# Patient Record
Sex: MALE | Race: WHITE | Employment: FULL TIME | URBAN - METROPOLITAN AREA
[De-identification: names, ages, dates, MRNs, and addresses within clinical notes are randomized per-mention and may not be internally consistent; named-entity substitution may affect disease eponyms.]

---

## 2017-08-09 ENCOUNTER — OFFICE VISIT (OUTPATIENT)
Dept: FAMILY MEDICINE CLINIC | Age: 32
End: 2017-08-09
Payer: COMMERCIAL

## 2017-08-09 ENCOUNTER — TELEPHONE (OUTPATIENT)
Dept: FAMILY MEDICINE CLINIC | Age: 32
End: 2017-08-09

## 2017-08-09 VITALS
TEMPERATURE: 98 F | SYSTOLIC BLOOD PRESSURE: 111 MMHG | BODY MASS INDEX: 21.76 KG/M2 | OXYGEN SATURATION: 99 % | HEIGHT: 70 IN | HEART RATE: 63 BPM | DIASTOLIC BLOOD PRESSURE: 66 MMHG | RESPIRATION RATE: 16 BRPM | WEIGHT: 152 LBS

## 2017-08-09 DIAGNOSIS — H10.31 ACUTE BACTERIAL CONJUNCTIVITIS OF RIGHT EYE: Primary | ICD-10-CM

## 2017-08-09 DIAGNOSIS — Z11.4 ENCOUNTER FOR SCREENING FOR HIV: ICD-10-CM

## 2017-08-09 DIAGNOSIS — Z23 NEED FOR VACCINATION: ICD-10-CM

## 2017-08-09 DIAGNOSIS — Z23 NEED FOR 23-POLYVALENT PNEUMOCOCCAL POLYSACCHARIDE VACCINE: ICD-10-CM

## 2017-08-09 PROCEDURE — 99203 OFFICE O/P NEW LOW 30 MIN: CPT | Performed by: FAMILY MEDICINE

## 2017-08-09 PROCEDURE — 90732 PPSV23 VACC 2 YRS+ SUBQ/IM: CPT | Performed by: FAMILY MEDICINE

## 2017-08-09 PROCEDURE — 90471 IMMUNIZATION ADMIN: CPT | Performed by: FAMILY MEDICINE

## 2017-08-09 RX ORDER — NEOMYCIN SULFATE, POLYMYXIN B SULFATE AND GRAMICIDIN 1.75; 10000; .025 MG/ML; [USP'U]/ML; MG/ML
1 SOLUTION/ DROPS OPHTHALMIC 4 TIMES DAILY
Qty: 1 BOTTLE | Refills: 0 | Status: SHIPPED | OUTPATIENT
Start: 2017-08-09 | End: 2017-12-27 | Stop reason: ALTCHOICE

## 2017-10-11 ENCOUNTER — NURSE ONLY (OUTPATIENT)
Dept: FAMILY MEDICINE CLINIC | Age: 32
End: 2017-10-11
Payer: COMMERCIAL

## 2017-10-11 DIAGNOSIS — T14.8XXA PUNCTURE WOUND: Primary | ICD-10-CM

## 2017-10-11 PROCEDURE — 90715 TDAP VACCINE 7 YRS/> IM: CPT | Performed by: FAMILY MEDICINE

## 2017-10-11 PROCEDURE — 90471 IMMUNIZATION ADMIN: CPT | Performed by: FAMILY MEDICINE

## 2017-11-29 ENCOUNTER — OFFICE VISIT (OUTPATIENT)
Dept: FAMILY MEDICINE CLINIC | Age: 32
End: 2017-11-29
Payer: COMMERCIAL

## 2017-11-29 ENCOUNTER — HOSPITAL ENCOUNTER (OUTPATIENT)
Age: 32
Discharge: HOME | End: 2017-11-29
Payer: COMMERCIAL

## 2017-11-29 VITALS
RESPIRATION RATE: 20 BRPM | OXYGEN SATURATION: 100 % | HEIGHT: 70 IN | TEMPERATURE: 96.9 F | BODY MASS INDEX: 22.54 KG/M2 | SYSTOLIC BLOOD PRESSURE: 90 MMHG | WEIGHT: 157.4 LBS | DIASTOLIC BLOOD PRESSURE: 62 MMHG | HEART RATE: 66 BPM

## 2017-11-29 DIAGNOSIS — R25.2 LEG CRAMPS: Primary | ICD-10-CM

## 2017-11-29 DIAGNOSIS — F17.200 SMOKING: ICD-10-CM

## 2017-11-29 DIAGNOSIS — Z00.00 ANNUAL PHYSICAL EXAM: Primary | ICD-10-CM

## 2017-11-29 DIAGNOSIS — R25.2 LEG CRAMPS: ICD-10-CM

## 2017-11-29 DIAGNOSIS — R29.898 WEAKNESS OF BOTH LOWER EXTREMITIES: ICD-10-CM

## 2017-11-29 DIAGNOSIS — Z00.00 ANNUAL PHYSICAL EXAM: ICD-10-CM

## 2017-11-29 PROBLEM — H10.31 ACUTE BACTERIAL CONJUNCTIVITIS OF RIGHT EYE: Status: RESOLVED | Noted: 2017-08-09 | Resolved: 2017-11-29

## 2017-11-29 LAB
FOLATE: 18.5 NG/ML
TOTAL CK: 829 U/L (ref 39–308)
TSH SERPL DL<=0.05 MIU/L-ACNC: 2.16 MIU/L (ref 0.3–5)
VITAMIN B-12: 724 PG/ML (ref 211–946)
VITAMIN D 25-HYDROXY: 15.5 NG/ML (ref 30–100)

## 2017-11-29 PROCEDURE — 82746 ASSAY OF FOLIC ACID SERUM: CPT

## 2017-11-29 PROCEDURE — 82550 ASSAY OF CK (CPK): CPT

## 2017-11-29 PROCEDURE — 84443 ASSAY THYROID STIM HORMONE: CPT

## 2017-11-29 PROCEDURE — 82306 VITAMIN D 25 HYDROXY: CPT

## 2017-11-29 PROCEDURE — 36415 COLL VENOUS BLD VENIPUNCTURE: CPT

## 2017-11-29 PROCEDURE — 82607 VITAMIN B-12: CPT

## 2017-11-29 PROCEDURE — 99395 PREV VISIT EST AGE 18-39: CPT | Performed by: FAMILY MEDICINE

## 2017-11-29 RX ORDER — VARENICLINE TARTRATE 1 MG/1
1 TABLET, FILM COATED ORAL 2 TIMES DAILY
Qty: 60 TABLET | Refills: 3 | Status: SHIPPED | OUTPATIENT
Start: 2017-11-29 | End: 2017-12-27

## 2017-11-29 ASSESSMENT — ENCOUNTER SYMPTOMS
EYE REDNESS: 0
BACK PAIN: 0
BLOOD IN STOOL: 0
SINUS PRESSURE: 0
ABDOMINAL PAIN: 0
PHOTOPHOBIA: 0
DIARRHEA: 0
SHORTNESS OF BREATH: 0
SINUS PAIN: 0
COUGH: 0
NAUSEA: 0
CONSTIPATION: 0
RHINORRHEA: 0
WHEEZING: 0

## 2017-11-29 NOTE — PATIENT INSTRUCTIONS
doctor or physical therapist will tell you when you can start these exercises and which ones will work best for you. How to do the exercises  Calf wall stretch (back knee straight)    5. Stand facing a wall with your hands on the wall at about eye level. Put your affected leg about a step behind your other leg. 6. Keeping your back leg straight and your back heel on the floor, bend your front knee and gently bring your hip and chest toward the wall until you feel a stretch in the calf of your back leg. 7. Hold the stretch for at least 15 to 30 seconds. 8. Repeat 2 to 4 times. Calf wall stretch (knees bent)    5. Stand facing a wall with your hands on the wall at about eye level. Put your affected leg about a step behind your other leg. 6. Keeping both heels on the floor, bend both knees. Then gently bring your hip and chest toward the wall until you feel a stretch in the calf of your back leg. 7. Hold the stretch for at least 15 to 30 seconds. 8. Repeat 2 to 4 times. Hamstring wall stretch    1. Lie on your back in a doorway, with your good leg through the open door. 2. Slide your affected leg up the wall to straighten your knee. You should feel a gentle stretch down the back of your leg. ¨ Do not arch your back. ¨ Do not bend either knee. ¨ Keep one heel touching the floor and the other heel touching the wall. Do not point your toes. 3. Hold the stretch for at least 1 minute to begin. Then over time, try to lengthen the time you hold the stretch to as long as 6 minutes. 4. Repeat 2 to 4 times. If you do not have a place to do this exercise in a doorway, there is another way to do it:  5. Lie on your back, and bend the knee of your affected leg. 6. Loop a towel under the ball and toes of that foot, and hold the ends of the towel in your hands. 7. Straighten your knee, and slowly pull back on the towel. You should feel a gentle stretch down the back of your leg.   8. Hold the stretch for 15 to 30 seconds. Or even better, hold the stretch for 1 minute if you can. 9. Repeat 2 to 4 times. Shin muscle stretch    1. Sit in a chair, with both feet flat on the floor. 2. Bend your affected leg behind you so that the top of your foot near your toes is flat on the floor and your toes are pointed away from your body. If you need to, you can hold on to the sides of the chair for support. 3. Hold the stretch for at least 15 to 30 seconds. You should feel a stretch in the front (shin) of your lower leg. 4. Repeat 2 to 4 times. Follow-up care is a key part of your treatment and safety. Be sure to make and go to all appointments, and call your doctor if you are having problems. It's also a good idea to know your test results and keep a list of the medicines you take. Where can you learn more? Go to https://Superplayer.Oxxy. org and sign in to your [employer] account. Enter P354 in the Qyuki box to learn more about \"Shin Splints (Shin Pain): Exercises. \"     If you do not have an account, please click on the \"Sign Up Now\" link. Current as of: March 21, 2017  Content Version: 11.3  © 4175-7964 [employer], [employer]. Care instructions adapted under license by [facility] ([facility]). If you have questions about a medical condition or this instruction, always ask your healthcare professional. [place] [address] any warranty or liability for your use of this information. Patient Education        Stretching: Exercises  Your Care Instructions  Here are some examples of exercises for stretching. Start each exercise slowly. Ease off the exercise if you start to have pain. Your doctor or physical therapist will tell you when you can start these exercises and which ones will work best for you. How to do the exercises  Latissimus stretch    9. Stand with your back straight and your feet shoulder-width apart.  You can do this stretch sitting down if you are not steady on your your inner thighs. 8. Hold 15 to 30 seconds. 9. Repeat 2 to 4 times. Hamstring stretch in doorway    1. Lie on the floor near a doorway, with your buttocks close to the wall. 2. Let the leg you are not stretching extend through the doorway. 3. Put the leg you want to stretch up on the wall, and straighten your knee to feel a gentle stretch at the back of your leg. 4. Hold the stretch for at least 15 to 30 seconds. Repeat 2 to 4 times. Follow-up care is a key part of your treatment and safety. Be sure to make and go to all appointments, and call your doctor if you are having problems. It's also a good idea to know your test results and keep a list of the medicines you take. Where can you learn more? Go to https://Stylytmorganeb.[email or web address]. org and sign in to your J[employer] account. Enter [id number] in the [employer] box to learn more about \"Stretching: Exercises. \"     If you do not have an account, please click on the \"Sign Up Now\" link. Current as of: March 13, 2017  Content Version: 11.3  © 3081-0517 [employer], [employer]. Care instructions adapted under license by [facility] ([facility]). If you have questions about a medical condition or this instruction, always ask your healthcare professional. [address] any warranty or liability for your use of this information.

## 2017-11-29 NOTE — PROGRESS NOTES
Chief Complaint   Patient presents with    Annual Exam     physial weakness in legs and feet started in the spring          [facility]  here today for follow up on chronic medical problems, go over labs and/or diagnostic studies, and medication refills. Annual Exam (physial weakness in legs and feet started in the spring )      HPI : Patient is here for annual physical, patient has no medical problems. Patient complains of bilateral lower extremity cramps and weakness. Patient reports he feels weak after he comes back from work. He has to grab his legs for work. He denies any numbness or tingling. He feels physically weak. He denies any back pain or any trauma. He has tried some stretching exercises did not help.    -He smokes about half pack a day since last 15 years. Patient is willing to quit and is trying nicotine patches in the past did not help. He is willing to try Chantix. BP 90/62   Pulse 66   Temp 96.9 °F (36.1 °C) (Tympanic)   Resp 20   Ht 5' 10\" (1.778 m)   Wt 157 lb 6.4 oz (71.4 kg)   SpO2 100%   BMI 22.58 kg/m²   Body mass index is 22.58 kg/m². Wt Readings from Last 3 Encounters:   11/29/17 157 lb 6.4 oz (71.4 kg)   08/09/17 152 lb (68.9 kg)   08/18/14 155 lb (70.3 kg)        []Negative depression screening. No flowsheet data found. []1-4 = Minimal depression   []5-9 = Mild depression   []10-14 = Moderate depression   []15-19 = Moderately severe depression   []20-27 = Severe depression    Discussed testing with the patient and all questions fully answered. No results found for any previous visit.          Most recent labs reviewed:     No results found for: WBC, HGB, HCT, MCV, PLT    No results found for: NA, K, CL, CO2, BUN, CREATININE, GLUCOSE, CALCIUM     No results found for: ALT, AST, GGT, ALKPHOS, BILITOT    No results found for: TSHFT4, TSH    No results found for: CHOL  No results found for: TRIG  No results found for: HDL  No results found for: LDLCALC, LDLCHOLESTEROL  No results found for: LABVLDL, VLDL  No results found for: CHOLHDLRATIO    No results found for: LABA1C    No results found for: XQNACLZO90    No results found for: FOLATE    No results found for: IRON, TIBC, FERRITIN    No results found for: VITD25          Current Outpatient Prescriptions   Medication Sig Dispense Refill    varenicline (CHANTIX CONTINUING MONTH DONN) 1 MG tablet Take 1 tablet by mouth 2 times daily 60 tablet 3    neomycin-polymyxin-gramicidin (NEOSPORIN) 1.75-03274-. 025 ophthalmic solution Place 1 drop into both eyes 4 times daily 1 Bottle 0     No current facility-administered medications for this visit. Social History     Social History    Marital status: Single     Spouse name: N/A    Number of children: N/A    Years of education: N/A     Occupational History    Not on file. Social History Main Topics    Smoking status: Current Every Day Smoker     Packs/day: 0.50     Years: 8.00     Types: Cigarettes    Smokeless tobacco: Current User    Alcohol use Yes      Comment: once a month    Drug use: No    Sexual activity: Not Currently     Other Topics Concern    Not on file     Social History Narrative    No narrative on file     Ready to quit: Yes  Counseling given: Not Answered        No family history on file.          -rest of complaints with corresponding details per ROS    The patient's past medical, surgical, social, and family history as well as his current medications and allergies were reviewed as documented in today's encounter. Review of Systems   Constitutional: Negative for activity change, appetite change, diaphoresis, fatigue, fever and unexpected weight change. HENT: Negative for congestion, ear pain, hearing loss, mouth sores, postnasal drip, rhinorrhea, sinus pain and sinus pressure. Eyes: Negative for photophobia, redness and visual disturbance. Respiratory: Negative for cough, shortness of breath and wheezing. provided for stretching exercises. - Vitamin D 25 Hydroxy; Future  - Vitamin B12 & Folate; Future  - TSH With Reflex Ft4; Future  - CK; Future    3. Smoking  -Started on Chantix. - varenicline (CHANTIX CONTINUING MONTH DONN) 1 MG tablet; Take 1 tablet by mouth 2 times daily  Dispense: 60 tablet; Refill: 3      Orders Placed This Encounter   Procedures    Vitamin D 25 Hydroxy     Standing Status:   Future     Standing Expiration Date:   11/29/2018    Vitamin B12 & Folate     Standing Status:   Future     Standing Expiration Date:   11/29/2018    TSH With Reflex Ft4     Standing Status:   Future     Standing Expiration Date:   11/29/2018    CK     Standing Status:   Future     Standing Expiration Date:   11/29/2018         There are no discontinued medications. Sundar Duncan received counseling on the following healthy behaviors: nutrition, exercise, medication adherence, tobacco cessation and decrease in alcohol consumption  Reviewed prior labs and health maintenance  Continue current medications, diet and exercise. Discussed use, benefit, and side effects of prescribed medications. Barriers to medication compliance addressed. Patient given educational materials - see patient instructions  Was a self-tracking handout given in paper form or via Zero2IPOt? Yes    Requested Prescriptions     Signed Prescriptions Disp Refills    varenicline (CHANTIX CONTINUING MONTH PAK) 1 MG tablet 60 tablet 3     Sig: Take 1 tablet by mouth 2 times daily       All patient questions answered. Patient voiced understanding. Quality Measures    Body mass index is 22.58 kg/m². Normal. Weight control planned discussed Healthy diet and regular exercise. BP: 90/62 Blood pressure is normal. Treatment plan consists of Avoid Tobacco and Second-hand Smoke and No treatment change needed.     No results found for: LDLCALC, LDLCHOLESTEROL, LDLDIRECT (goal LDL reduction with dx if diabetes is 50% LDL reduction)      No flowsheet data

## 2017-12-01 DIAGNOSIS — E55.9 VITAMIN D DEFICIENCY: Primary | ICD-10-CM

## 2017-12-04 ENCOUNTER — HOSPITAL ENCOUNTER (OUTPATIENT)
Age: 32
Discharge: HOME | End: 2017-12-04
Payer: COMMERCIAL

## 2017-12-04 DIAGNOSIS — R29.898 WEAKNESS OF BOTH LOWER EXTREMITIES: ICD-10-CM

## 2017-12-04 DIAGNOSIS — R25.2 LEG CRAMPS: ICD-10-CM

## 2017-12-04 LAB
ABSOLUTE EOS #: 0.2 K/UL (ref 0–0.4)
ABSOLUTE IMMATURE GRANULOCYTE: NORMAL K/UL (ref 0–0.3)
ABSOLUTE LYMPH #: 1.9 K/UL (ref 1–4.8)
ABSOLUTE MONO #: 0.4 K/UL (ref 0.1–1.3)
ALBUMIN SERPL-MCNC: 4 G/DL (ref 3.5–5.2)
ALBUMIN/GLOBULIN RATIO: ABNORMAL (ref 1–2.5)
ALP BLD-CCNC: 82 U/L (ref 40–129)
ALT SERPL-CCNC: 45 U/L (ref 5–41)
ANION GAP SERPL CALCULATED.3IONS-SCNC: 11 MMOL/L (ref 9–17)
AST SERPL-CCNC: 32 U/L
BASOPHILS # BLD: 1 % (ref 0–2)
BASOPHILS ABSOLUTE: 0.1 K/UL (ref 0–0.2)
BILIRUB SERPL-MCNC: 0.24 MG/DL (ref 0.3–1.2)
BUN BLDV-MCNC: 16 MG/DL (ref 6–20)
BUN/CREAT BLD: ABNORMAL (ref 9–20)
CALCIUM SERPL-MCNC: 8.8 MG/DL (ref 8.6–10.4)
CHLORIDE BLD-SCNC: 104 MMOL/L (ref 98–107)
CO2: 24 MMOL/L (ref 20–31)
CREAT SERPL-MCNC: 0.65 MG/DL (ref 0.7–1.2)
DIFFERENTIAL TYPE: NORMAL
EOSINOPHILS RELATIVE PERCENT: 2 % (ref 0–4)
GFR AFRICAN AMERICAN: >60 ML/MIN
GFR NON-AFRICAN AMERICAN: >60 ML/MIN
GFR SERPL CREATININE-BSD FRML MDRD: ABNORMAL ML/MIN/{1.73_M2}
GFR SERPL CREATININE-BSD FRML MDRD: ABNORMAL ML/MIN/{1.73_M2}
GLUCOSE BLD-MCNC: 108 MG/DL (ref 70–99)
HCT VFR BLD CALC: 46.3 % (ref 41–53)
HEMOGLOBIN: 15.6 G/DL (ref 13.5–17.5)
IMMATURE GRANULOCYTES: NORMAL %
LYMPHOCYTES # BLD: 27 % (ref 24–44)
MCH RBC QN AUTO: 31.6 PG (ref 26–34)
MCHC RBC AUTO-ENTMCNC: 33.8 G/DL (ref 31–37)
MCV RBC AUTO: 93.5 FL (ref 80–100)
MONOCYTES # BLD: 6 % (ref 1–7)
PDW BLD-RTO: 13.2 % (ref 11.5–14.9)
PLATELET # BLD: 301 K/UL (ref 150–450)
PLATELET ESTIMATE: NORMAL
PMV BLD AUTO: 7.8 FL (ref 6–12)
POTASSIUM SERPL-SCNC: 4.1 MMOL/L (ref 3.7–5.3)
RBC # BLD: 4.95 M/UL (ref 4.5–5.9)
RBC # BLD: NORMAL 10*6/UL
SEDIMENTATION RATE, ERYTHROCYTE: 2 MM (ref 0–15)
SEG NEUTROPHILS: 64 % (ref 36–66)
SEGMENTED NEUTROPHILS ABSOLUTE COUNT: 4.5 K/UL (ref 1.3–9.1)
SODIUM BLD-SCNC: 139 MMOL/L (ref 135–144)
TOTAL PROTEIN: 6.3 G/DL (ref 6.4–8.3)
WBC # BLD: 7 K/UL (ref 3.5–11)
WBC # BLD: NORMAL 10*3/UL

## 2017-12-04 PROCEDURE — 85025 COMPLETE CBC W/AUTO DIFF WBC: CPT

## 2017-12-04 PROCEDURE — 80053 COMPREHEN METABOLIC PANEL: CPT

## 2017-12-04 PROCEDURE — 86038 ANTINUCLEAR ANTIBODIES: CPT

## 2017-12-04 PROCEDURE — 36415 COLL VENOUS BLD VENIPUNCTURE: CPT

## 2017-12-04 PROCEDURE — 85651 RBC SED RATE NONAUTOMATED: CPT

## 2017-12-05 LAB — ANTI-NUCLEAR ANTIBODY (ANA): NEGATIVE

## 2017-12-07 ENCOUNTER — TELEPHONE (OUTPATIENT)
Dept: FAMILY MEDICINE CLINIC | Age: 32
End: 2017-12-07

## 2017-12-07 DIAGNOSIS — R29.898 WEAKNESS OF BOTH LOWER EXTREMITIES: Primary | ICD-10-CM

## 2017-12-07 DIAGNOSIS — R74.8 ELEVATED CPK: ICD-10-CM

## 2017-12-07 NOTE — TELEPHONE ENCOUNTER
I reffered this pt to rheumatology  For elevated CPK and lower extremity weakness. Please follow on that.

## 2017-12-27 ENCOUNTER — OFFICE VISIT (OUTPATIENT)
Dept: FAMILY MEDICINE CLINIC | Age: 32
End: 2017-12-27
Payer: COMMERCIAL

## 2017-12-27 VITALS
WEIGHT: 159 LBS | BODY MASS INDEX: 22.76 KG/M2 | SYSTOLIC BLOOD PRESSURE: 101 MMHG | HEIGHT: 70 IN | DIASTOLIC BLOOD PRESSURE: 65 MMHG | HEART RATE: 66 BPM

## 2017-12-27 DIAGNOSIS — R74.8 ELEVATED CPK: Primary | ICD-10-CM

## 2017-12-27 DIAGNOSIS — R29.898 WEAKNESS OF BOTH LOWER EXTREMITIES: ICD-10-CM

## 2017-12-27 DIAGNOSIS — E55.9 VITAMIN D DEFICIENCY: ICD-10-CM

## 2017-12-27 PROCEDURE — 99214 OFFICE O/P EST MOD 30 MIN: CPT | Performed by: FAMILY MEDICINE

## 2017-12-27 ASSESSMENT — ENCOUNTER SYMPTOMS
ABDOMINAL PAIN: 0
ANAL BLEEDING: 0
SHORTNESS OF BREATH: 0
PHOTOPHOBIA: 0
BLOOD IN STOOL: 0
COUGH: 0
WHEEZING: 0
DIARRHEA: 0
RHINORRHEA: 0
SINUS PRESSURE: 0
CONSTIPATION: 0

## 2017-12-27 ASSESSMENT — PATIENT HEALTH QUESTIONNAIRE - PHQ9
SUM OF ALL RESPONSES TO PHQ9 QUESTIONS 1 & 2: 0
1. LITTLE INTEREST OR PLEASURE IN DOING THINGS: 0
SUM OF ALL RESPONSES TO PHQ QUESTIONS 1-9: 0
2. FEELING DOWN, DEPRESSED OR HOPELESS: 0

## 2017-12-27 NOTE — PROGRESS NOTES
Chief Complaint   Patient presents with    Fatigue     Both legs. Juan Sanchez  here today for follow up on chronic medical problems, go over labs and/or diagnostic studies, and medication refills. Fatigue (Both legs. )      HPI:Patient is here for follow-up on lab work and lower leg weakness. Patient blood work showed low vitamin D, he was started on vitamin D reports he did not pick that prescription yet. He has elevated CPK, patient was referred to a rheumatologist.  He has not made an appointment. Discussed with patient about all lab results. He is still complaining of fatigue and difficulty in going stairs, especially at the end of day. He denies any back pain or any spine injuries. /65   Pulse 66   Ht 5' 10\" (1.778 m)   Wt 159 lb (72.1 kg)   BMI 22.81 kg/m²   Body mass index is 22.81 kg/m². Wt Readings from Last 3 Encounters:   12/27/17 159 lb (72.1 kg)   11/29/17 157 lb 6.4 oz (71.4 kg)   08/09/17 152 lb (68.9 kg)        []Negative depression screening. PHQ Scores 12/27/2017   PHQ2 Score 0   PHQ9 Score 0      []1-4 = Minimal depression   []5-9 = Mild depression   []10-14 = Moderate depression   []15-19 = Moderately severe depression   []20-27 = Severe depression    Discussed testing with the patient and all questions fully answered. Hospital Outpatient Visit on 12/04/2017   Component Date Value Ref Range Status    GEORGI 12/05/2017 NEGATIVE  NEG Final    Comment: This test was run on the [employer] GEORGI test system. The system provides   ten test results (HEp-2NA, dsDNA, SSA, SSB, Sm, RNP, Scl-70, Marichuy-1, Centromere   and Histone analytes) from a single patient sample. A negative GEORGI screen   indicates that the specimen was negative for all ten markers.   Performed at [facility], [address] [place] [address] [phone]      WBC 12/04/2017 7.0  3.5 - 11.0 k/uL Final    RBC 12/04/2017 4.95  4.5 - 5.9 m/uL Final    Hemoglobin 12/04/2017 15.6  13.5 - Total Bilirubin 12/04/2017 0.24* 0.3 - 1.2 mg/dL Final    Total Protein 12/04/2017 6.3* 6.4 - 8.3 g/dL Final    Alb 12/04/2017 4.0  3.5 - 5.2 g/dL Final    Albumin/Globulin Ratio 12/04/2017 NOT REPORTED  1.0 - 2.5 Final    GFR Non- 12/04/2017 >60  >60 mL/min Final    GFR  12/04/2017 >60  >60 mL/min Final    GFR Comment 12/04/2017        Final    Comment: Average GFR for 30-36 years old:   80 mL/min/1.73sq m  Chronic Kidney Disease:   <60 mL/min/1.73sq m  Kidney failure:   <15 mL/min/1.73sq m              eGFR calculated using average adult body mass. Additional eGFR calculator   available at:        [email or web address]        Performed at [facility]: RANI CLARKE [address]   [phone]      GFR Staging 12/04/2017 NOT REPORTED   Final    Sed Rate 12/04/2017 2  0 - 15 mm Final    Comment: Performed at [facility]: [facility] [address] [facility]  [address]   [phone]           Most recent labs reviewed:     Lab Results   Component Value Date    WBC 7.0 12/04/2017    HGB 15.6 12/04/2017    HCT 46.3 12/04/2017    MCV 93.5 12/04/2017     12/04/2017       Lab Results   Component Value Date     12/04/2017    K 4.1 12/04/2017     12/04/2017    CO2 24 12/04/2017    BUN 16 12/04/2017    CREATININE 0.65 12/04/2017    GLUCOSE 108 12/04/2017    CALCIUM 8.8 12/04/2017        Lab Results   Component Value Date    ALT 45 (H) 12/04/2017    AST 32 12/04/2017    ALKPHOS 82 12/04/2017    BILITOT 0.24 (L) 12/04/2017       Lab Results   Component Value Date    TSH 2.16 11/29/2017       No results found for: CHOL  No results found for: TRIG  No results found for: HDL  No results found for: LDLCALC, LDLCHOLESTEROL  No results found for: LABVLDL, VLDL  No results found for: CHOLHDLRATIO    No results found for: [facility]    Lab Results   Component Value Date    EBGSJSPT89 724 11/29/2017       Lab Results Component Value Date    FOLATE 18.5 11/29/2017       No results found for: IRON, TIBC, FERRITIN    Lab Results   Component Value Date    VITD25 15.5 (L) 11/29/2017             Current Outpatient Prescriptions   Medication Sig Dispense Refill    Cholecalciferol 400 UNIT TABS tablet Take 1 tablet by mouth daily 30 tablet 3     No current facility-administered medications for this visit. Social History     Social History    Marital status: Single     Spouse name: N/A    Number of children: N/A    Years of education: N/A     Occupational History    Not on file. Social History Main Topics    Smoking status: Current Every Day Smoker     Packs/day: 0.50     Years: 8.00     Types: Cigarettes    Smokeless tobacco: Never Used    Alcohol use Yes      Comment: once a month    Drug use: No    Sexual activity: Not Currently     Other Topics Concern    Not on file     Social History Narrative    No narrative on file     Ready to quit: Yes  Counseling given: Yes        History reviewed. No pertinent family history.          -rest of complaints with corresponding details per ROS    The patient's past medical, surgical, social, and family history as well as his current medications and allergies were reviewed as documented in today's encounter. Review of Systems   Constitutional: Positive for fatigue. Negative for activity change, appetite change, fever and unexpected weight change. HENT: Negative for congestion, postnasal drip, rhinorrhea and sinus pressure. Eyes: Negative for photophobia and visual disturbance. Respiratory: Negative for cough, shortness of breath and wheezing. Cardiovascular: Negative for chest pain and palpitations. Gastrointestinal: Negative for abdominal pain, anal bleeding, blood in stool, constipation and diarrhea. Genitourinary: Negative for flank pain, frequency and testicular pain. Musculoskeletal: Positive for arthralgias.  Negative for gait problem and neck stiffness. Neurological: Positive for weakness and numbness. Negative for dizziness and light-headedness. Psychiatric/Behavioral: Negative for behavioral problems, decreased concentration, dysphoric mood and hallucinations. Physical Exam    PHYSICAL EXAM:   VITALS:   Vitals:    12/27/17 1122   BP: 101/65   Pulse: 66     GENERAL:  Patient is a well-developed, well-nourished male  in no acute distress, alert and oriented x3, appropriate and pleasant conversation. HEAD: Normocephalic, atraumatic. EYES: Pupils equal, round and reactive to light and accommodation, extraocular   movements intact. ENT: Moist mucous membranes. No erythema is noted. NECK: Supple. No masses. No lymphadenopathy. CARDIOVASCULAR: Regular rate and rhythm. PULMONARY: Lungs are clear to auscultation bilaterally. ABDOMEN: Soft, nontender, nondistended. Positive bowel sounds. MUSCULOSKELETAL: Strength 5/5 bilaterally in all extremities. No tenderness to   palpation of the ribs, long bones, or spine. Bilateral calf muscle tenderness, no swelling seen. No muscle atrophy seen. Reflexes normal.  Sensations normal  NEUROLOGIC: Cranial nerves II through XII grossly intact. No focal deficits are noted. ASSESSMENT AND PLAN      1. Elevated CPK  -Discussed with patient that he'll need to see a dermatologist, labs reprinted and encouraged to scheduled appointment. We will do EMG. - EMG; Future    2. Weakness of both lower extremities  -EMG as per orders, start vitamin D  - EMG; Future    3. Vitamin D deficiency  -Start vitamin D  - EMG; Future      Orders Placed This Encounter   Procedures    EMG     Standing Status:   Future     Standing Expiration Date:   2/25/2018     Order Specific Question:   Which body part? Answer:   b/l lower extremity weakness         Medications Discontinued During This Encounter   Medication Reason    neomycin-polymyxin-gramicidin (NEOSPORIN) 1.75-55404-. 025 ophthalmic solution Therapy completed    varenicline (CHANTIX CONTINUING MONTH PAK) 1 MG tablet Patient Choice       Navin received counseling on the following healthy behaviors: nutrition, exercise and medication adherence  Reviewed prior labs and health maintenance  Continue current medications, diet and exercise. Discussed use, benefit, and side effects of prescribed medications. Barriers to medication compliance addressed. Patient given educational materials - see patient instructions  Was a self-tracking handout given in paper form or via Revolution [employer]? Yes    Requested Prescriptions      No prescriptions requested or ordered in this encounter       All patient questions answered. Patient voiced understanding. Quality Measures    Body mass index is 22.81 kg/m². Normal. Weight control planned discussed Healthy diet and regular exercise. BP: 101/65 Blood pressure is normal. Treatment plan consists of No treatment change needed. No results found for: LDLCALC, LDLCHOLESTEROL, LDLDIRECT (goal LDL reduction with dx if diabetes is 50% LDL reduction)      PHQ Scores 12/27/2017   PHQ2 Score 0   PHQ9 Score 0     Interpretation of Total Score Depression Severity: 1-4 = Minimal depression, 5-9 = Mild depression, 10-14 = Moderate depression, 15-19 = Moderately severe depression, 20-27 = Severe depression      The patient's past medical, surgical, social, and family history as well as his   current medications and allergies were reviewed as documented in today's encounter. Medications, labs, diagnostic studies, consultations and follow-up as documented in this encounter. Return in about 2 months (around 2/27/2018) for for emg, results, rheumatology note . Patient was seen with total face to face time of 25 minutes. More than 50% of this visit was counseling and education.        Future Appointments  Date Time Provider Aman Gillespie   2/28/2018 8:30 AM [facility]     This note was completed by using the assistance of a speech-recognition program. However, inadvertent computerized transcription errors may be present. Although every effort was made to ensure accuracy, no guarantees can be provided that every mistake has been identified and corrected by editing.   Electronically signed by Bobby Barrios MD on 12/27/2017  12:13 PM

## 2018-05-02 ENCOUNTER — HOSPITAL ENCOUNTER (OUTPATIENT)
Dept: NEUROLOGY | Age: 33
Discharge: HOME | End: 2018-05-02
Payer: COMMERCIAL

## 2018-05-02 PROCEDURE — 95886 MUSC TEST DONE W/N TEST COMP: CPT | Performed by: PHYSICAL MEDICINE & REHABILITATION

## 2018-05-02 PROCEDURE — 95911 NRV CNDJ TEST 9-10 STUDIES: CPT | Performed by: PHYSICAL MEDICINE & REHABILITATION

## 2018-05-15 ENCOUNTER — TELEPHONE (OUTPATIENT)
Dept: FAMILY MEDICINE CLINIC | Age: 33
End: 2018-05-15

## 2018-05-22 ENCOUNTER — OFFICE VISIT (OUTPATIENT)
Dept: FAMILY MEDICINE CLINIC | Age: 33
End: 2018-05-22
Payer: COMMERCIAL

## 2018-05-22 VITALS
HEIGHT: 69 IN | HEART RATE: 58 BPM | SYSTOLIC BLOOD PRESSURE: 109 MMHG | TEMPERATURE: 97.2 F | WEIGHT: 153.2 LBS | OXYGEN SATURATION: 98 % | DIASTOLIC BLOOD PRESSURE: 63 MMHG | BODY MASS INDEX: 22.69 KG/M2

## 2018-05-22 DIAGNOSIS — M21.372 FOOT DROP, BILATERAL: Primary | ICD-10-CM

## 2018-05-22 DIAGNOSIS — R29.898 WEAKNESS OF BOTH LOWER EXTREMITIES: ICD-10-CM

## 2018-05-22 DIAGNOSIS — M21.371 FOOT DROP, BILATERAL: Primary | ICD-10-CM

## 2018-05-22 DIAGNOSIS — R74.8 ELEVATED CPK: ICD-10-CM

## 2018-05-22 DIAGNOSIS — R94.131 ABNORMAL EMG: ICD-10-CM

## 2018-05-22 PROCEDURE — 99214 OFFICE O/P EST MOD 30 MIN: CPT | Performed by: FAMILY MEDICINE

## 2018-05-22 PROCEDURE — G8420 CALC BMI NORM PARAMETERS: HCPCS | Performed by: FAMILY MEDICINE

## 2018-05-22 PROCEDURE — G8427 DOCREV CUR MEDS BY ELIG CLIN: HCPCS | Performed by: FAMILY MEDICINE

## 2018-05-22 PROCEDURE — 4004F PT TOBACCO SCREEN RCVD TLK: CPT | Performed by: FAMILY MEDICINE

## 2018-05-22 ASSESSMENT — ENCOUNTER SYMPTOMS
STRIDOR: 0
SINUS PAIN: 0
PHOTOPHOBIA: 0
DIARRHEA: 0
SINUS PRESSURE: 0
ABDOMINAL PAIN: 0
WHEEZING: 0
RHINORRHEA: 0
BACK PAIN: 0
COUGH: 0
CONSTIPATION: 0
SHORTNESS OF BREATH: 0

## 2018-05-30 ENCOUNTER — TELEPHONE (OUTPATIENT)
Dept: FAMILY MEDICINE CLINIC | Age: 33
End: 2018-05-30

## 2018-05-30 DIAGNOSIS — R29.898 WEAKNESS OF BOTH LOWER EXTREMITIES: ICD-10-CM

## 2018-05-30 DIAGNOSIS — M21.372 FOOT DROP, BILATERAL: Primary | ICD-10-CM

## 2018-05-30 DIAGNOSIS — R74.8 ELEVATED CPK: ICD-10-CM

## 2018-05-30 DIAGNOSIS — M21.371 FOOT DROP, BILATERAL: Primary | ICD-10-CM

## 2018-05-30 DIAGNOSIS — R94.131 ABNORMAL EMG: ICD-10-CM

## 2018-06-04 ENCOUNTER — HOSPITAL ENCOUNTER (OUTPATIENT)
Dept: MRI IMAGING | Age: 33
Discharge: HOME | End: 2018-06-06
Payer: COMMERCIAL

## 2018-06-04 DIAGNOSIS — R29.898 WEAKNESS OF BOTH LOWER EXTREMITIES: ICD-10-CM

## 2018-06-04 DIAGNOSIS — M21.372 FOOT DROP, BILATERAL: ICD-10-CM

## 2018-06-04 DIAGNOSIS — M21.371 FOOT DROP, BILATERAL: ICD-10-CM

## 2018-06-04 DIAGNOSIS — R74.8 ELEVATED CPK: ICD-10-CM

## 2018-06-04 DIAGNOSIS — R94.131 ABNORMAL EMG: ICD-10-CM

## 2018-06-04 PROCEDURE — 72148 MRI LUMBAR SPINE W/O DYE: CPT

## 2018-06-07 DIAGNOSIS — M54.16 SPINAL STENOSIS OF LUMBAR REGION WITH RADICULOPATHY: ICD-10-CM

## 2018-06-07 DIAGNOSIS — M48.061 SPINAL STENOSIS OF LUMBAR REGION WITH RADICULOPATHY: ICD-10-CM

## 2018-06-07 DIAGNOSIS — R74.8 ELEVATED CPK: ICD-10-CM

## 2018-06-07 DIAGNOSIS — M21.372 FOOT DROP, BILATERAL: ICD-10-CM

## 2018-06-07 DIAGNOSIS — R94.131 ABNORMAL EMG: Primary | ICD-10-CM

## 2018-06-07 DIAGNOSIS — M21.371 FOOT DROP, BILATERAL: ICD-10-CM

## 2018-06-12 ENCOUNTER — HOSPITAL ENCOUNTER (OUTPATIENT)
Dept: PHYSICAL THERAPY | Age: 33
Setting detail: THERAPIES SERIES
Discharge: HOME | End: 2018-06-12
Payer: COMMERCIAL

## 2018-06-12 PROCEDURE — 97162 PT EVAL MOD COMPLEX 30 MIN: CPT

## 2018-06-12 PROCEDURE — 97110 THERAPEUTIC EXERCISES: CPT

## 2018-06-12 ASSESSMENT — PAIN DESCRIPTION - LOCATION: LOCATION: LEG

## 2018-06-12 ASSESSMENT — PAIN DESCRIPTION - PROGRESSION: CLINICAL_PROGRESSION: GRADUALLY WORSENING

## 2018-06-12 ASSESSMENT — PAIN SCALES - GENERAL: PAINLEVEL_OUTOF10: 1

## 2018-06-12 ASSESSMENT — PAIN DESCRIPTION - DESCRIPTORS: DESCRIPTORS: ACHING

## 2018-06-12 ASSESSMENT — PAIN DESCRIPTION - ORIENTATION: ORIENTATION: RIGHT;LEFT

## 2018-06-14 ENCOUNTER — INITIAL CONSULT (OUTPATIENT)
Dept: NEUROSURGERY | Age: 33
End: 2018-06-14
Payer: COMMERCIAL

## 2018-06-14 VITALS
HEART RATE: 70 BPM | BODY MASS INDEX: 22.66 KG/M2 | HEIGHT: 69 IN | WEIGHT: 153 LBS | DIASTOLIC BLOOD PRESSURE: 62 MMHG | SYSTOLIC BLOOD PRESSURE: 107 MMHG

## 2018-06-14 DIAGNOSIS — G95.9 MYELOPATHY (HCC): ICD-10-CM

## 2018-06-14 DIAGNOSIS — R29.898 WEAKNESS OF BOTH LOWER EXTREMITIES: Primary | ICD-10-CM

## 2018-06-14 PROCEDURE — 99242 OFF/OP CONSLTJ NEW/EST SF 20: CPT | Performed by: NEUROLOGICAL SURGERY

## 2018-06-14 PROCEDURE — G8420 CALC BMI NORM PARAMETERS: HCPCS | Performed by: NEUROLOGICAL SURGERY

## 2018-06-14 PROCEDURE — G8427 DOCREV CUR MEDS BY ELIG CLIN: HCPCS | Performed by: NEUROLOGICAL SURGERY

## 2018-06-15 ENCOUNTER — HOSPITAL ENCOUNTER (OUTPATIENT)
Dept: PHYSICAL THERAPY | Age: 33
Setting detail: THERAPIES SERIES
Discharge: HOME | End: 2018-06-15
Payer: COMMERCIAL

## 2018-06-15 PROCEDURE — 97110 THERAPEUTIC EXERCISES: CPT

## 2018-06-15 ASSESSMENT — PAIN DESCRIPTION - PROGRESSION: CLINICAL_PROGRESSION: NOT CHANGED

## 2018-06-15 ASSESSMENT — PAIN SCALES - GENERAL: PAINLEVEL_OUTOF10: 0

## 2018-06-18 ENCOUNTER — HOSPITAL ENCOUNTER (OUTPATIENT)
Dept: PHYSICAL THERAPY | Age: 33
Setting detail: THERAPIES SERIES
Discharge: HOME | End: 2018-06-18
Payer: COMMERCIAL

## 2018-06-22 ENCOUNTER — HOSPITAL ENCOUNTER (OUTPATIENT)
Dept: PHYSICAL THERAPY | Age: 33
Setting detail: THERAPIES SERIES
Discharge: HOME | End: 2018-06-22
Payer: COMMERCIAL

## 2018-06-22 PROCEDURE — 97110 THERAPEUTIC EXERCISES: CPT

## 2018-06-22 ASSESSMENT — PAIN SCALES - GENERAL: PAINLEVEL_OUTOF10: 0

## 2018-06-22 ASSESSMENT — PAIN DESCRIPTION - PROGRESSION: CLINICAL_PROGRESSION: NOT CHANGED

## 2018-06-26 ENCOUNTER — APPOINTMENT (OUTPATIENT)
Dept: PHYSICAL THERAPY | Age: 33
End: 2018-06-26
Payer: COMMERCIAL

## 2018-06-26 ENCOUNTER — OFFICE VISIT (OUTPATIENT)
Dept: NEUROLOGY | Age: 33
End: 2018-06-26
Payer: COMMERCIAL

## 2018-06-26 VITALS
HEIGHT: 70 IN | SYSTOLIC BLOOD PRESSURE: 106 MMHG | BODY MASS INDEX: 27.06 KG/M2 | WEIGHT: 189 LBS | DIASTOLIC BLOOD PRESSURE: 66 MMHG | HEART RATE: 62 BPM

## 2018-06-26 DIAGNOSIS — R29.898 WEAKNESS OF BOTH LOWER EXTREMITIES: Primary | ICD-10-CM

## 2018-06-26 PROCEDURE — G8427 DOCREV CUR MEDS BY ELIG CLIN: HCPCS | Performed by: PSYCHIATRY & NEUROLOGY

## 2018-06-26 PROCEDURE — 99205 OFFICE O/P NEW HI 60 MIN: CPT | Performed by: PSYCHIATRY & NEUROLOGY

## 2018-06-26 PROCEDURE — G8419 CALC BMI OUT NRM PARAM NOF/U: HCPCS | Performed by: PSYCHIATRY & NEUROLOGY

## 2018-06-26 PROCEDURE — 1036F TOBACCO NON-USER: CPT | Performed by: PSYCHIATRY & NEUROLOGY

## 2018-06-29 ENCOUNTER — HOSPITAL ENCOUNTER (OUTPATIENT)
Dept: PHYSICAL THERAPY | Age: 33
Setting detail: THERAPIES SERIES
Discharge: HOME | End: 2018-06-29
Payer: COMMERCIAL

## 2018-06-29 PROCEDURE — 97110 THERAPEUTIC EXERCISES: CPT

## 2018-06-29 ASSESSMENT — PAIN DESCRIPTION - PROGRESSION: CLINICAL_PROGRESSION: NOT CHANGED

## 2018-06-29 ASSESSMENT — PAIN SCALES - GENERAL: PAINLEVEL_OUTOF10: 0

## 2018-07-03 ENCOUNTER — HOSPITAL ENCOUNTER (OUTPATIENT)
Dept: PHYSICAL THERAPY | Age: 33
Setting detail: THERAPIES SERIES
Discharge: HOME | End: 2018-07-03
Payer: COMMERCIAL

## 2018-07-03 PROCEDURE — 97110 THERAPEUTIC EXERCISES: CPT

## 2018-07-03 ASSESSMENT — PAIN SCALES - GENERAL: PAINLEVEL_OUTOF10: 0

## 2018-07-03 ASSESSMENT — PAIN DESCRIPTION - PROGRESSION: CLINICAL_PROGRESSION: NOT CHANGED

## 2018-07-07 ENCOUNTER — HOSPITAL ENCOUNTER (OUTPATIENT)
Dept: MRI IMAGING | Age: 33
Discharge: HOME | End: 2018-07-09
Payer: COMMERCIAL

## 2018-07-07 DIAGNOSIS — R29.898 WEAKNESS OF BOTH LOWER EXTREMITIES: ICD-10-CM

## 2018-07-07 DIAGNOSIS — G95.9 MYELOPATHY (HCC): ICD-10-CM

## 2018-07-07 PROCEDURE — 72141 MRI NECK SPINE W/O DYE: CPT

## 2018-07-07 PROCEDURE — 6360000004 HC RX CONTRAST MEDICATION: Performed by: NEUROLOGICAL SURGERY

## 2018-07-07 PROCEDURE — 2580000003 HC RX 258: Performed by: NEUROLOGICAL SURGERY

## 2018-07-07 PROCEDURE — A9579 GAD-BASE MR CONTRAST NOS,1ML: HCPCS | Performed by: NEUROLOGICAL SURGERY

## 2018-07-07 PROCEDURE — 70553 MRI BRAIN STEM W/O & W/DYE: CPT

## 2018-07-07 RX ORDER — SODIUM CHLORIDE 0.9 % (FLUSH) 0.9 %
10 SYRINGE (ML) INJECTION PRN
Status: DISCONTINUED | OUTPATIENT
Start: 2018-07-07 | End: 2018-07-10 | Stop reason: HOSPADM

## 2018-07-07 RX ADMIN — GADOTERIDOL 15 ML: 279.3 INJECTION, SOLUTION INTRAVENOUS at 09:03

## 2018-07-07 RX ADMIN — Medication 10 ML: at 09:03

## 2018-07-11 ENCOUNTER — TELEPHONE (OUTPATIENT)
Dept: NEUROSURGERY | Age: 33
End: 2018-07-11

## 2018-07-12 ENCOUNTER — HOSPITAL ENCOUNTER (OUTPATIENT)
Dept: MRI IMAGING | Age: 33
Discharge: HOME | End: 2018-07-14
Payer: COMMERCIAL

## 2018-07-12 DIAGNOSIS — G95.9 MYELOPATHY (HCC): ICD-10-CM

## 2018-07-12 DIAGNOSIS — R29.898 WEAKNESS OF BOTH LOWER EXTREMITIES: ICD-10-CM

## 2018-07-12 PROCEDURE — 72146 MRI CHEST SPINE W/O DYE: CPT

## 2018-07-13 ENCOUNTER — HOSPITAL ENCOUNTER (OUTPATIENT)
Dept: PHYSICAL THERAPY | Age: 33
Setting detail: THERAPIES SERIES
Discharge: HOME | End: 2018-07-13
Payer: COMMERCIAL

## 2018-07-13 ENCOUNTER — TELEPHONE (OUTPATIENT)
Dept: NEUROSURGERY | Age: 33
End: 2018-07-13

## 2018-07-13 PROCEDURE — 97110 THERAPEUTIC EXERCISES: CPT

## 2018-07-13 ASSESSMENT — PAIN DESCRIPTION - PROGRESSION: CLINICAL_PROGRESSION: NOT CHANGED

## 2018-07-13 NOTE — PROGRESS NOTES
Extension: 3+/5;4/5  L Ankle Dorsiflexion: 1/5  L Ankle Plantar Flexion: 3/5;3+/5  L Ankle Inversion: 3/5  L Ankle Eversion: 0/5  L Great Toe Extension: 0/5    Exercises  Exercise 1: Nustep, L5, 8'  Exercise 2: Step-ups, 6\", F/L, 10x@  Exercise 3: Calf stretches, 30\"x3  Exercise 4: Partial squats 10x 2; Lunges 10x@ - HEP  Exercise 5: SLS, 10x@  Exercise 6: 4way hip, red Tband, 10x@ - other leg on 4\" step - HEP - reviewed  Exercise 7: Knee extension, 20#, 10x3; Hams curls, 20#, 10x3  Exercise 8: Leg press, 45#, 10x3  Exercise 9: Cable walkout, 10#, 4 ways, 3x@  Exercise 10: Red Tband, B ankles, Df/PF, 10x@ - HEP  Exercise 11: BAPS,standing, L3, 10x@    Assessment:   Conditions Requiring Skilled Therapeutic Intervention  Body structures, Functions, Activity limitations: Decreased ADL status; Decreased functional mobility ; Decreased ROM; Decreased strength;Decreased balance;Decreased high-level IADLs  Assessment: Other problem: impaired gait. Still hasn't had the chance to check on the AFO as previously suggested. Has been busy moving to his new residence. Meeting 1/3 STG, 0/2 LTG, no significant gains made yet  Treatment Diagnosis: Impaired gait, decreased ROM/ strength and function of BLE's  Prognosis: Good  Patient Education: reviewed theraband ex. REQUIRES PT FOLLOW UP: Yes  Discharge Recommendations: Continue to assess pending progress    OutComes Score  LEFS Total Score: 33   Goals:  Short term goals  Time Frame for Short term goals: 6 visits  Short term goal 1: Improve ROM by 5-10 degrees in restricted planes to be able to put on socks/shoes without difficulty - ONGOING  Short term goal 2: Improve stance on SLS / tandem by 5 sec @ - PARTLY MET  Short term goal 3:  Independent with HEP - MET  Long term goals  Time Frame for Long term goals : 12 visits  Long term goal 1: Improve gait pattern with decreased high steppage, monitor efficacy of AFO once patient gets them - ONGOING  Long term goal 2: Improve score in

## 2018-07-13 NOTE — TELEPHONE ENCOUNTER
Jerilynn Harada, MD [facility]             He needs to follow-up with neurologist to review the results and undergo further work-up as needed. Previous Messages      ----- Message -----   From: Ulises Schaffer   Sent: 7/12/2018   To: Jerilynn Harada, MD     Patient is set to have MRI Thoracic today at 7:00 a.m. He has completed his MRI Brain and MRI Cervical. He would like to know the results, and if he needs to follow up with your or if the results just require a referral to other specialists as discussed in last visit. Thank you.

## 2018-07-20 ENCOUNTER — HOSPITAL ENCOUNTER (OUTPATIENT)
Dept: PHYSICAL THERAPY | Age: 33
Setting detail: THERAPIES SERIES
Discharge: HOME | End: 2018-07-20
Payer: COMMERCIAL

## 2018-07-20 PROCEDURE — 97110 THERAPEUTIC EXERCISES: CPT

## 2018-07-20 ASSESSMENT — PAIN DESCRIPTION - PROGRESSION: CLINICAL_PROGRESSION: NOT CHANGED

## 2018-07-24 ENCOUNTER — OFFICE VISIT (OUTPATIENT)
Dept: NEUROSURGERY | Age: 33
End: 2018-07-24
Payer: COMMERCIAL

## 2018-07-24 VITALS
HEART RATE: 68 BPM | SYSTOLIC BLOOD PRESSURE: 111 MMHG | HEIGHT: 70 IN | WEIGHT: 149 LBS | BODY MASS INDEX: 21.33 KG/M2 | DIASTOLIC BLOOD PRESSURE: 65 MMHG | TEMPERATURE: 97.6 F

## 2018-07-24 DIAGNOSIS — R29.898 WEAKNESS OF BOTH LOWER EXTREMITIES: Primary | ICD-10-CM

## 2018-07-24 PROCEDURE — G8420 CALC BMI NORM PARAMETERS: HCPCS | Performed by: NEUROLOGICAL SURGERY

## 2018-07-24 PROCEDURE — 1036F TOBACCO NON-USER: CPT | Performed by: NEUROLOGICAL SURGERY

## 2018-07-24 PROCEDURE — G8427 DOCREV CUR MEDS BY ELIG CLIN: HCPCS | Performed by: NEUROLOGICAL SURGERY

## 2018-07-24 PROCEDURE — 99213 OFFICE O/P EST LOW 20 MIN: CPT | Performed by: NEUROLOGICAL SURGERY

## 2018-07-24 NOTE — PROGRESS NOTES
allergies. Hematological: Negative for adenopathy. Does not bruise/bleed easily. Psychiatric/Behavioral: Negative for self-injury. The patient is not nervous/anxious. Physical Exam:      Physical Examination  /65 (Site: Right Arm, Position: Sitting, Cuff Size: Small Adult)   Pulse 68   Temp 97.6 °F (36.4 °C)   Ht 5' 10\" (1.778 m)   Wt 149 lb (67.6 kg)   BMI 21.38 kg/m²   Estimated body mass index is 21.38 kg/m² as calculated from the following:    Height as of this encounter: 5' 10\" (1.778 m). Weight as of this encounter: 149 lb (67.6 kg). General:  Fabiola Dwyer is a 27y.o. year old male who appears his stated age. HEENT: Normocephalic atraumatic. Neck supple. Neurologic Exam  Speech is fluent and intact. Affect and visage are normal and appropriate. Cranial nerves II through XII: Intact  Motor examination: Upper extremities strength is full and symmetric throughout. Left  hip flexor 5/5 knee extensor 5/5 TA 3/5 EHL 3/5 Soleus 4/5 Hip flexor 5/5 knee extensor 5/5. Right TA 3/5 EHL 3/5 Soleus 4/5. Sensory examination: Intact perception of light touch. Reflex examination: Patella 1/4. Negative Junior sign. Gait and posture: Upright posture. Slapping gait. Studies Review:     Reviewed MRI brain with and without contrast Type:  Film and Report    Images:  Date of Image: 7/7/18    No abnormality to explain foot drop. MRI cervical spine without contrast 7/7/18:    No cervical myelopathy. MRI thoracic spine without contrast 7/12/18:    1. Mild-to-moderate multilevel degenerative disc disease throughout the   thoracic spine with multilevel disc height loss and desiccation.  Scattered   shallow disc bulges without thoracic spinal canal or neural foraminal   stenosis. Assessment and Plan:      1.  Weakness of both lower extremities          Plan: Fabiola Dwyer is a 70-year-old  male previously seen in neurosurgery clinic on 6/14/18 for management of bilateral

## 2018-07-27 ENCOUNTER — HOSPITAL ENCOUNTER (OUTPATIENT)
Dept: PHYSICAL THERAPY | Age: 33
Setting detail: THERAPIES SERIES
Discharge: HOME | End: 2018-07-27
Payer: COMMERCIAL

## 2018-07-27 PROCEDURE — 97110 THERAPEUTIC EXERCISES: CPT

## 2018-07-27 ASSESSMENT — PAIN DESCRIPTION - PROGRESSION: CLINICAL_PROGRESSION: NOT CHANGED

## 2018-07-27 NOTE — PROGRESS NOTES
[address]   Outpatient Physical Therapy  [address] [facility]. [address]  Phone: [phone]  Fax: [phone]    Daily Progress Note    Date: 18    Patient Name: Zafar Harris        MRN: [id number]  Account: [de-identified] : 1985      General Information:  Referring Practitioner: Dr. Jovan Salinas  Referral Date : 18  Diagnosis: Foot drop, bilateral M21.371, M21.372 ; Weakness of BLE's R29.898  Follows Commands: Within Functional Limits  Onset Date: 17  PT Insurance Information: Medical [place] 60 visits/ year  Total # of Visits Approved: 12  Total # of Visits to Date: 8  No Show: 2  Canceled Appointment: 1    Subjective:  Subjective: Seen at doctor's office, referred to a neurologist . Violeta Jeter with leg cramps, feels L leg gets weaker     Pain:  Patient Currently in Pain: Denies  Clinical Progression: Not changed       Objective:  Exercise 1: Nustep, L5, 8'  Exercise 2: Step-ups, 6\", F/L, 10x@  Exercise 3: Calf stretches, 30\"x3  Exercise 5: SLS,5x@, on balance pad  Exercise 7: Knee extension, 20#, 10x3; Hams curls, 20#, 10x3  Exercise 8: Leg press, 45#, 10x3  Exercise 9: Cable walkout, 10#, 4 ways, 3x@  Exercise 11: BAPS,standing, L3, 10x@       Assessment: Body structures, Functions, Activity limitations: Decreased ADL status; Decreased functional mobility ; Decreased ROM; Decreased strength;Decreased high-level IADLs;Decreased balance  Assessment: Other problem: impaired gait. Reports MRI findings are normal for brain, cervical, thoracic regions. Rule out MS, will be scheduled for spinal tap. No significant change with gait. Reports fatigued after the exercises, unable to increase weights.   Treatment Diagnosis: Impaired gait, decreased ROM/ strength and function of BLE's  Prognosis: Good  REQUIRES PT FOLLOW UP: Yes  Discharge Recommendations: Continue to assess pending progress   Goals:  Patient Goals:  Patient goals : \" to be able to run again\"  Short Term Goals:  Time Frame for Short term goals: 6 visits  Short term goal 1: Improve ROM by 5-10 degrees in restricted planes to be able to put on socks/shoes without difficulty - ONGOING  Short term goal 2: Improve stance on SLS / tandem by 5 sec @ - PARTLY MET  Short term goal 3: Independent with HEP - MET  Long Term Goals:  Time Frame for Long term goals : 12 visits  Long term goal 1: Improve gait pattern with decreased high steppage, monitor efficacy of AFO once patient gets them - ONGOING  Long term goal 2: Improve score in LEFS by 10 points from 32 ( 40%, 60% disability index ) - ONGOING ( NOW 33 )    Plan:     Times per week: 2x/wk  Plan weeks: 6  Current Treatment Recommendations: Strengthening;ROM;Balance Training;Patient/Caregiver Education & Training;Home Exercise Program  Plan Comment: Progress as tolerated.      Therapy Time:  Time In: 0730  Time Out: 0815  Minutes: 45  Timed Code Treatment Minutes: 35 Minutes    Treatment Charges: Minutes Units   []  Ultrasound     []  Electrical-Stim     []  Iontophoresis     []  Traction     []  Massage       []  Eval     []  Gait     [x]  Ther Exercise 35  2    []  Manual Therapy       []  Ther Activities       []  Aquatics     []  Neuro Re-Ed       []  Other       Total Treatment Time: 28 2        Donna Bustamante PT Electronically signed by Yudi Casanova PT on 7/27/2018 at 8:15 AM

## 2018-08-03 ENCOUNTER — TELEPHONE (OUTPATIENT)
Dept: FAMILY MEDICINE CLINIC | Age: 33
End: 2018-08-03

## 2018-08-03 ENCOUNTER — HOSPITAL ENCOUNTER (OUTPATIENT)
Dept: PHYSICAL THERAPY | Age: 33
Setting detail: THERAPIES SERIES
Discharge: HOME | End: 2018-08-03
Payer: COMMERCIAL

## 2018-08-03 NOTE — TELEPHONE ENCOUNTER
I cannot order that , neurologist can do in their office,if he thinks there is indication. It is not done at lab.

## 2018-08-03 NOTE — PROGRESS NOTES
[address] [place] [address]   Outpatient Physical Therapy  Cancel/No Show Note    Date: 8/3/18    Patient Name: Nidia Stroud        MRN: [id number]  Account: [de-identified] : 1985      General Information:  Referring Practitioner: Dr. Simón Martinez  Referral Date : 18  Diagnosis: Foot drop, bilateral M21.371, M21.372 ; Weakness of BLE's R29.898  Onset Date: 17  PT Insurance Information: Medical [place] 60 visits/ year  Total # of Visits Approved: 12  Total # of Visits to Date: 8  No Show: 3  Canceled Appointment: 1    Subjective: No show for today        Marguerite Mohs, PT Electronically signed by Marguerite Mohs, PT on 8/3/2018 at 8:08 AM

## 2018-08-22 ENCOUNTER — INITIAL CONSULT (OUTPATIENT)
Dept: PHYSICAL MEDICINE AND REHAB | Age: 33
End: 2018-08-22
Payer: COMMERCIAL

## 2018-08-22 VITALS
SYSTOLIC BLOOD PRESSURE: 112 MMHG | HEART RATE: 62 BPM | DIASTOLIC BLOOD PRESSURE: 65 MMHG | HEIGHT: 70 IN | TEMPERATURE: 98.4 F | BODY MASS INDEX: 21.85 KG/M2 | WEIGHT: 152.6 LBS

## 2018-08-22 DIAGNOSIS — R29.898 WEAKNESS OF BOTH LOWER EXTREMITIES: Primary | ICD-10-CM

## 2018-08-22 DIAGNOSIS — M21.372 BILATERAL FOOT-DROP: ICD-10-CM

## 2018-08-22 DIAGNOSIS — M21.371 BILATERAL FOOT-DROP: ICD-10-CM

## 2018-08-22 PROCEDURE — 1036F TOBACCO NON-USER: CPT | Performed by: PHYSICAL MEDICINE & REHABILITATION

## 2018-08-22 PROCEDURE — G8427 DOCREV CUR MEDS BY ELIG CLIN: HCPCS | Performed by: PHYSICAL MEDICINE & REHABILITATION

## 2018-08-22 PROCEDURE — G8420 CALC BMI NORM PARAMETERS: HCPCS | Performed by: PHYSICAL MEDICINE & REHABILITATION

## 2018-08-22 PROCEDURE — 99204 OFFICE O/P NEW MOD 45 MIN: CPT | Performed by: PHYSICAL MEDICINE & REHABILITATION

## 2018-08-22 NOTE — PROGRESS NOTES
[facility] PHYSICIANS  [facility] PHYSICAL MEDICINE AND REHABILITATION  [address] [id number]  Dept: [phone]  Dept Fax: [phone]    New Patient Consultation Note    Taz Torres, 27 y.o., male, is c/o of Other ( bilateral foot drop and bilateral lower extremity weakness;  was in PT previously;  had emg testing and MRI,  is currently treating with a neurologist and has seen a neurosurgeon but is not a surgical candidate)   and request for evaluation of  by Edin Castillo MD.    HPI:     HPI  Patient referred for evaluation of bilateral foot drop. He is status post EMG testing. He's been seen by neurology and neurosurgery. He is awaiting follow-up with neurology again. 22-year-old male who notes distal lower extremity weakness questionable some proximal weakness  particularly on the left. He notes symptoms began  1 year ago and gradually noted progressive weakness of distal lower extremity particularly since November 2017. He feels  left is greater than the right. He denies any trauma or accident ,denies any low back pain. He denies any difficulties bowels or bladder. He's had EMG , MRI, neurology evaluation neurosurgery evaluation. He was supposed to have spinal tap for CSF study and follow-up again with neurology. He has not had his completed. 7/24/18  Neurosurgery evaluation notes MRI spine without contrast 6/4/18 which reviewed.,  MRI brain 7/718, cervical spine without contrast 7/718 and thoracic spine without contrast 7/12/18 were reviewed by neurosurgery. There is some evidence of moderate degenerative changes within the thoracic spine but nothing to explain dorsiflexion weakness. He did not feel the patient benefited from a surgical intervention. He is to follow-up with neurology.     6/26/18 Neurology -Dr. Angie Rai, reviewed EMG nerve conduction studies which noted multilevel radiculopathy lower extremities, neurology did not feel patient has peripheral neuropathy, - concerned of possible CIDP distal form, recommended LP for CSF study and possible repeat EMG/nerve conduction studies. Patient's follow-up with neurology in 6-8 weeks. There was notied some elevated CPK on blood work per neurology    EMG 5/2/18 done by myself-  subacute to chronic bilateral multilevel radiculopathy affecting at least the L3 to S1 level and greater  of the L4 and L5-S1 level bilaterally. Review  noted decreased /absent amplitude of bilateral peroneal motors, right tibial motor reveal normal amplitude, there is no prolonged distal latencies, conduction velocities were normal, no temporal dispersion or blocking was noted. The right upper extremity ulnar sensory and motor appeared within normal limits and EMG of the right upper extremity appeared be normal at that time. MRI lumbosacral spine 6/4/18  1. Scattered degenerative changes of the lumbar spine without significant   spinal canal stenosis or neural foraminal narrowing. 2. Minimal retrolisthesis at L5-S1.         MRI cervical spine 7/7/18  No cervical myelopathy    MRI thoracic spine 7/12/18  . Mild-to-moderate multilevel degenerative disc disease throughout the   thoracic spine with multilevel disc height loss and desiccation.  Scattered   shallow disc bulges without thoracic spinal canal or neural foraminal   stenosis. MRI brain 7/7/18  No abnormality to explain foot drop. Past Medical History:   Diagnosis Date    Cleft lip       Past Surgical History:   Procedure Laterality Date    APPENDECTOMY      CLEFT LIP REPAIR         Family History   Problem Relation Age of Onset    Diabetes Father        Social History   Substance Use Topics    Smoking status: Former Smoker     Packs/day: 0.50     Years: 8.00     Types: Cigarettes     Start date: 6/14/2002     Quit date: 5/14/2018    Smokeless tobacco: Never Used    Alcohol use No        No current outpatient prescriptions on file.      No current facility-administered medications for this visit. Allergies   Allergen Reactions    Vicodin [Hydrocodone-Acetaminophen] Other (See Comments)     Pt states that he blacks out. Subjective:      Review of Systems  CONSTITUTIONAL: Negative for fever, chills, sweat, fatigue and unexpected weight change. HEENT:  Negative for diplopia, blind spots, blurred vision, hearing loss, trouble chewing or swallowing, denies coughing while eating or drinking denies nosebleed    RESPIRATORY: Negative for wheezing, coughing or shortness of breath    CARDIOVASCULAR: Negative for chest pain, palpitations, lightheadedness  GASTROINTESTINAL: Negative for heartburn, nausea, constipation, diarrhea, abdominal pain  or incontinence  GENTIOURNIARY: Negative for dysuria, urgency, frequency, incontinence and difficulty urinating. ENDOCRINE: Negative for hot or cold intolerance, denies any significant weight change  MUSCULOSKELETAL: Negative for focal joint pain, back pain, neck pain and arthralgias. Humphrey Vance: Negative for focal weakness, numbness, tingling, balance loss, headaches or falls  BEHAVIOR/PSYCY: Denies depression, anxiety, memory loss or insomnia  SKIN: Denies ulcers, rashes or bruises     Objective:     Physical Exam  /65   Pulse 62   Temp 98.4 °F (36.9 °C) (Tympanic)   Ht 5' 10\" (1.778 m)   Wt 152 lb 9.6 oz (69.2 kg)   BMI 21.90 kg/m²     Nursing notes and vitals reviewed    CONSTITUTIONAL: He is oriented to person, place, and time. He appears well-developed and well-nourished.    HEENT: Pupils equal reactive to light, ocular motion intact, visual fields are full, no facial asymmetry, speech fluent, no dysarthria, comprehension intact, object naming intact, repetition intact, basic cognition intact, there is questionable fasciculations of tongue - equivocal, cleft palate  CARDIOVASCULAR: Heart regular rate and rhythm with no murmurs rubs or gallops   PULMONARY/CHEST: Clear to auscultation with no wheezes or crackles noted  ABDOMEN: Soft, nontender with positive bowel sounds, no guarding or rebound   NEUROLOGIC:    Orientation: Alert and oriented ×3,    cranial nerves:  II through XII are intact,   Strength:5 out of 5 throughout bilateral upper    Sensation: Intact to light touch and pinprick   Balance: Intact, Romberg's is negative   Deep tendon reflexes: Bilaterally equal and symmetric-upper extremities,     EXTREMITIES: No calf tenderness, , negative warmth, pulses are intact  SKIN: Skin is warm and dry. PSYCIATIRIC: normal mood and affect, behavior is normal. Thought content normal.   Examination lower extremities reveals deep tendon reflexes 1+ at the quads and absent the ankles, strength reveals 0 out of 5 EHL strength bilaterally, dorsiflexion right and left 2/5 plantar flexion 4/5, inversion 1/5 inversion 3-4/5 and proximal knee and hip strength is 5 out of 5, sensation appear grossly intact to light touch and pinprick, he notes some questionable weakness of left hip though not noted on exam.  He ambulate with steppage gait bilaterally and foot slap  without assistive device. Assessment:       Diagnosis Orders   1. Weakness of both lower extremities  AFO Replaced by [facility] -  Jorge Alberto Ortiz MD, Neurology [facility]    EMG   2. Bilateral foot-drop   [facility] Addie Opitz, MD, Neurology [facility]    EMG          Plan:      1. Patient continues with lower extremity weakness and foot drop of question etiology. EMG showed significant abnormality of the  lower extremities with absent to decrease amplitude peroneal responses, however, there was no noted prolonged distal latency, temporal dispersion, blocking, etc.  Conduction velocities were normal..  Examination right upper extremity appeared to be normal.  EMG showed acute denervation/chronic reinnervation pattern of the lower extremities but was normal in the right upper extremity. This was read as a multilevel subacute to acute radiculopathy lower extremities. However considering MRI results and neurosurgical evaluation this does not appear be coming from the spine. Patient examination with continued lower extermity weakness and foot drop with questionable progression per patient and plus minus fasciculation of tongue  -, will need further workup from neurology i.e. CIDP/motor neuron disorder/axonal neuropathy/myositic process etc.  As EMG was done back in May 2018, would recommend repeat testing for further evaluation. This was ordered and will obtain over the next few weeks. 2.  Due to bilateral foot drop, would recommend bilateral ankle-foot orthoses and physical therapy to work on ambulation with best assistive device. Fall precautions were reviewed. A prescription for both were given. 3.  The importance for follow-up with neurology for spinal tap, follow-up and evaluation was reinforced with patient. He was given the number and referral to return back to neurology for further evaluation and recommendation. We'll also repeat EMG and have these forwarded to neurology as well. Orders Placed This Encounter   Procedures    AFO Brace     Bilateral due to foot drop   [address]     Referral Priority:   Routine     Referral Type:   Eval and Treat     Referral Reason:   Specialty Services Required     Requested Specialty:   Physical Therapy     Number of Visits Requested:   [address] [facility] Paula Templeton MD, Neurology [address]     Referral Priority:   Routine     Referral Type:   Eval and Treat     Referral Reason:   Specialty Services Required     Referred to Provider:   Kearney Spurling, MD     Requested Specialty:   Neurology     Number of Visits Requested:   1         Return in about 2 months (around 10/22/2018) for Rehab, EMG. Electronically signed by Case Leonard. Alla El MD on 8/22/2018 at 6:05 PM    Please note that this chart was generated

## 2018-08-22 NOTE — LETTER
[facility] PHYSICIANS  [facility] PHYSICAL MEDICINE AND REHABILITATION  [address]  [facility] [address]  Dept: [phone]  Dept Fax: [phone]      8/22/18    Patient: Omaira Amezquita  YOB: 1985    Dear  Meron Avery MD ,    I had the pleasure of seeing your patient, Omaira Amezquita today in the office today. Below are the relevant portions of my assessment and plan of care. IMPRESSION:      Diagnosis Orders   1. Weakness of both lower extremities  AFO [facility], Neu[facility] [address]    EMG   2. Bilateral foot-drop  AFO 1 [facility] Rae Catherine MD, Neurology [address]    EMG       PLAN:     1. Patient continues with lower extremity weakness and foot drop of question etiology. EMG showed significant abnormality of the  lower extremities with absent to decrease amplitude peroneal responses, however, there was no noted prolonged distal latency, temporal dispersion, blocking, etc.  Conduction velocities were normal..  Examination right upper extremity appeared to be normal.  EMG showed acute denervation/chronic reinnervation pattern of the lower extremities but was normal in the right upper extremity. This was read as a multilevel subacute to acute radiculopathy lower extremities. However considering MRI results and neurosurgical evaluation this does not appear be coming from the spine. Patient examination with continued lower extermity weakness and foot drop with questionable progression per patient and plus minus fasciculation of tongue  -, will need further workup from neurology i.e. CIDP/motor neuron disorder/axonal neuropathy/myositic process etc.  As EMG was done back in May 2018, would recommend repeat testing for further evaluation. This was ordered and will obtain over the next few weeks. 2.  Due to bilateral foot drop, would recommend bilateral ankle-foot orthoses and physical therapy to work on ambulation with best assistive device. Fall precautions were reviewed. A prescription for both were given. 3.  The importance for follow-up with neurology for spinal tap, follow-up and evaluation was reinforced with patient. He was given the number and referral to return back to neurology for further evaluation and recommendation. We'll also repeat EMG and have these forwarded to neurology as well. Orders Placed This Encounter   Procedures    AFO Brace     Bilateral due to foot drop   [address]     Referral Priority:   Routine     Referral Type:   Eval and Treat     Referral Reason:   Specialty Services Required     Requested Specialty:   Physical Therapy     Number of Visits Requested:   [address] [facility] Ester Bernard MD, Neurology [address]     Referral Priority:   Routine     Referral Type:   Eval and Treat     Referral Reason:   Specialty Services Required     Referred to Provider:   Corbin Perales MD     Requested Specialty:   Neurology     Number of Visits Requested:   1         Return in about 2 months (around 10/22/2018) for Rehab, EMG. Thank you for allowing me to participate in the care of this patient. I will keep you updated on this patient's follow up and I look forward to serving you and your patients again in the future. Cayden Machuca. Oscar Salinas MD

## 2018-08-23 ENCOUNTER — TELEPHONE (OUTPATIENT)
Dept: NEUROLOGY | Age: 33
End: 2018-08-23

## 2018-08-23 DIAGNOSIS — R29.898 WEAKNESS OF BOTH LOWER EXTREMITIES: Primary | ICD-10-CM

## 2018-08-23 NOTE — TELEPHONE ENCOUNTER
Let's arrange him for LP, I have placed order. He can have it done in next 1-3 weeks, come back after EMG is done. Please explain to him and his mother, LP is only for diagnostic purpose, it may give us helpful information but also may return normal. If he has questions, he can see me next week to review his imaging and discuss any question regarding LP. Thanks.

## 2018-08-23 NOTE — TELEPHONE ENCOUNTER
Gorge Sanchez Navin's mom called in today. She said that Willow Iraheta  kind of feels like he has been getting the run around from the different healthcare professionals. She was wondering if he was supposed to have an LP. I reviewed Dr. Kriss Albarran note which says will consider LP after EMG and imaging ordered by neurosurgery is done. He had the imaging. He saw Parker Muller yesterday in consult and his note is in Epic and the EMG is scheduled for 9/5/18 with Dr. Alexi Majano. Have you reviewed the MRI results yet? They were resulted to Dr. Pura Alcantar who had ordered them. Mom is concerned because he is getting worse and doesn't feel like he is getting answers. This is not about our office specifically, just his general treatment plan from all physicians. Do you want to order the LP now or have him back right after the EMG to evaluate him and decide from there? Her number is [phone]. He sleeps during the day time so it is easier to communicate with her and she is on the HIPAA consent form.

## 2018-09-04 ENCOUNTER — HOSPITAL ENCOUNTER (OUTPATIENT)
Dept: INTERVENTIONAL RADIOLOGY/VASCULAR | Age: 33
Discharge: HOME | End: 2018-09-06
Payer: COMMERCIAL

## 2018-09-10 ENCOUNTER — CLINICAL DOCUMENTATION (OUTPATIENT)
Dept: PHYSICAL MEDICINE AND REHAB | Age: 33
End: 2018-09-10

## 2018-09-21 ENCOUNTER — HOSPITAL ENCOUNTER (OUTPATIENT)
Dept: INTERVENTIONAL RADIOLOGY/VASCULAR | Age: 33
Discharge: HOME | End: 2018-09-23
Payer: COMMERCIAL

## 2018-09-21 VITALS
SYSTOLIC BLOOD PRESSURE: 96 MMHG | TEMPERATURE: 97.7 F | OXYGEN SATURATION: 97 % | RESPIRATION RATE: 14 BRPM | BODY MASS INDEX: 22.19 KG/M2 | HEIGHT: 70 IN | WEIGHT: 155 LBS | DIASTOLIC BLOOD PRESSURE: 57 MMHG | HEART RATE: 59 BPM

## 2018-09-21 DIAGNOSIS — R29.898 WEAKNESS OF BOTH LEGS: ICD-10-CM

## 2018-09-21 LAB
APPEARANCE CSF: CLEAR
GLUCOSE, CSF: 56 MG/DL (ref 40–70)
PROTEIN CSF: 26.1 MG/DL (ref 15–45)
RBC CSF: 1 /MM3
SUPERNAT COLOR CSF: COLORLESS
TUBE NUMBER CSF: 3
VOLUME CSF: 11
WBC CSF: 0 /MM3
XANTHOCHROMIA: ABNORMAL

## 2018-09-21 PROCEDURE — 82945 GLUCOSE OTHER FLUID: CPT

## 2018-09-21 PROCEDURE — 2500000003 HC RX 250 WO HCPCS: Performed by: RADIOLOGY

## 2018-09-21 PROCEDURE — 77003 FLUOROGUIDE FOR SPINE INJECT: CPT | Performed by: RADIOLOGY

## 2018-09-21 PROCEDURE — 62270 DX LMBR SPI PNXR: CPT | Performed by: RADIOLOGY

## 2018-09-21 PROCEDURE — 89050 BODY FLUID CELL COUNT: CPT

## 2018-09-21 PROCEDURE — 7100000030 HC ASPR PHASE II RECOVERY - FIRST 15 MIN

## 2018-09-21 PROCEDURE — 84157 ASSAY OF PROTEIN OTHER: CPT

## 2018-09-21 PROCEDURE — 7100000031 HC ASPR PHASE II RECOVERY - ADDTL 15 MIN

## 2018-09-21 PROCEDURE — 83873 ASSAY OF CSF PROTEIN: CPT

## 2018-09-21 PROCEDURE — 2709999900 IR LUMBAR PUNCTURE FOR DIAGNOSIS

## 2018-09-21 RX ORDER — LIDOCAINE HYDROCHLORIDE 20 MG/ML
INJECTION, SOLUTION INFILTRATION; PERINEURAL
Status: COMPLETED | OUTPATIENT
Start: 2018-09-21 | End: 2018-09-21

## 2018-09-21 RX ADMIN — LIDOCAINE HYDROCHLORIDE 5 ML: 20 INJECTION, SOLUTION INFILTRATION; PERINEURAL at 09:11

## 2018-09-21 NOTE — BRIEF OP NOTE
Brief Postoperative Note    Rory Jaramillo  YOB: 1985  [id number]    Pre-operative Diagnosis: LE weakness. Post-operative Diagnosis: Same    Procedure: LP under fluoro    Anesthesia: Local    Surgeons/Assistants: Saeid Freitas MD    Estimated Blood Loss: less than 50     Complications: None    Specimens: Was Obtained: 11 mls clear CSF provided for requested lab studies    Findings: 20 g spinal needle inserted intrathecally at L2-L3 under fluoro guidance.     Electronically signed by Saeid Freitas MD on 9/21/2018 at 9:35 AM

## 2018-09-21 NOTE — H&P
prior to encounter. Negative except for what is mentioned in the HPI. GENERAL PHYSICAL EXAM     Vitals: BP 97/63   Pulse 60   Temp 97.9 °F (36.6 °C) (Temporal)   Resp 14   Ht 5' 10\" (1.778 m)   Wt 155 lb (70.3 kg)   SpO2 97%   BMI 22.24 kg/m²  Body mass index is 22.24 kg/m². GENERAL APPEARANCE:   Cong Cifuentes is 27 y.o.,  male, not obese, nourished, conscious, alert. Does not appear to be distress or pain at this time. SKIN:  Warm, dry, no cyanosis or jaundice. HEAD:  Normocephalic, atraumatic, no swelling or tenderness. EYES:  Pupils equal, reactive to light. EARS:  No discharge, no marked hearing loss. NOSE:  No rhinorrhea, epistaxis or septal deformity. THROAT:  Not congested. No ulceration bleeding or discharge. NECK:  No stiffness, trachea central.  No palpable masses or L.N.                 CHEST:  Symmetrical and equal on expansion. HEART:  RRR S1 > S2. No audible murmurs or gallops. LUNGS:  Equal on expansion, normal breath sounds. No adventitious sounds. ABDOMEN:   Soft on palpation. No localized tenderness. No guarding or rigidity. No palpable hepatosplenomegaly. LYMPHATICS:  No palpable cervical lymphadenopathy. LOCOMOTOR, BACK AND SPINE:  No tenderness or deformities. EXTREMITIES:  Symmetrical, no pretibial edema. Virginias sign negative. No discoloration or ulcerations. NEUROLOGIC:  The patient is conscious, alert, oriented, No apparent focal sensory or motor deficits.                       PROVISIONAL DIAGNOSES / SURGERY:      WEAKNESS IN MARICRUZ LOWER EXTREMITIES  MARICRUZ FOOT DROP  LUMBAR PUNCTURE    Patient Active Problem List    Diagnosis Date Noted    Spinal stenosis of lumbar region with radiculopathy 06/07/2018    Foot drop, bilateral 05/22/2018    Abnormal EMG 05/22/2018    Elevated CPK 12/27/2017    Weakness of both lower extremities 12/27/2017    Vitamin D deficiency 12/27/2017    Cleft lip            ENRIQUE CROOKS, APRN - CNP on 9/21/2018 at 8:43 AM

## 2018-09-24 ENCOUNTER — HOSPITAL ENCOUNTER (EMERGENCY)
Age: 33
Discharge: HOME | End: 2018-09-25
Attending: EMERGENCY MEDICINE
Payer: COMMERCIAL

## 2018-09-24 VITALS
HEART RATE: 66 BPM | BODY MASS INDEX: 21.47 KG/M2 | HEIGHT: 70 IN | SYSTOLIC BLOOD PRESSURE: 120 MMHG | RESPIRATION RATE: 16 BRPM | WEIGHT: 150 LBS | OXYGEN SATURATION: 98 % | TEMPERATURE: 97.8 F | DIASTOLIC BLOOD PRESSURE: 69 MMHG

## 2018-09-24 DIAGNOSIS — G97.1 SPINAL PUNCTURE HEADACHE: Primary | ICD-10-CM

## 2018-09-24 LAB
ABSOLUTE EOS #: 0 K/UL (ref 0–0.4)
ABSOLUTE IMMATURE GRANULOCYTE: ABNORMAL K/UL (ref 0–0.3)
ABSOLUTE LYMPH #: 0.9 K/UL (ref 1–4.8)
ABSOLUTE MONO #: 0.2 K/UL (ref 0.2–0.8)
ANION GAP SERPL CALCULATED.3IONS-SCNC: 15 MMOL/L (ref 9–17)
BASOPHILS # BLD: 0 % (ref 0–2)
BASOPHILS ABSOLUTE: 0 K/UL (ref 0–0.2)
BUN BLDV-MCNC: 12 MG/DL (ref 6–20)
BUN/CREAT BLD: 20 (ref 9–20)
CALCIUM SERPL-MCNC: 9.2 MG/DL (ref 8.6–10.4)
CHLORIDE BLD-SCNC: 100 MMOL/L (ref 98–107)
CO2: 23 MMOL/L (ref 20–31)
CREAT SERPL-MCNC: 0.61 MG/DL (ref 0.7–1.2)
DIFFERENTIAL TYPE: ABNORMAL
EOSINOPHILS RELATIVE PERCENT: 0 % (ref 1–4)
GFR AFRICAN AMERICAN: >60 ML/MIN
GFR NON-AFRICAN AMERICAN: >60 ML/MIN
GFR SERPL CREATININE-BSD FRML MDRD: ABNORMAL ML/MIN/{1.73_M2}
GFR SERPL CREATININE-BSD FRML MDRD: ABNORMAL ML/MIN/{1.73_M2}
GLUCOSE BLD-MCNC: 119 MG/DL (ref 70–99)
HCT VFR BLD CALC: 45.4 % (ref 41–53)
HEMOGLOBIN: 15.9 G/DL (ref 13.5–17.5)
IMMATURE GRANULOCYTES: ABNORMAL %
LYMPHOCYTES # BLD: 9 % (ref 24–44)
MCH RBC QN AUTO: 31.6 PG (ref 26–34)
MCHC RBC AUTO-ENTMCNC: 35 G/DL (ref 31–37)
MCV RBC AUTO: 90.3 FL (ref 80–100)
MONOCYTES # BLD: 2 % (ref 1–7)
MYELIN BASIC PROTEIN, CSF: 2.52 NG/ML (ref 0–5.5)
NRBC AUTOMATED: ABNORMAL PER 100 WBC
PDW BLD-RTO: 12.9 % (ref 11.5–14.5)
PLATELET # BLD: 283 K/UL (ref 130–400)
PLATELET ESTIMATE: ABNORMAL
PMV BLD AUTO: 6.6 FL (ref 6–12)
POTASSIUM SERPL-SCNC: 3.7 MMOL/L (ref 3.7–5.3)
RBC # BLD: 5.02 M/UL (ref 4.5–5.9)
RBC # BLD: ABNORMAL 10*6/UL
SEG NEUTROPHILS: 89 % (ref 36–66)
SEGMENTED NEUTROPHILS ABSOLUTE COUNT: 8.9 K/UL (ref 1.8–7.7)
SODIUM BLD-SCNC: 138 MMOL/L (ref 135–144)
WBC # BLD: 10.1 K/UL (ref 3.5–11)
WBC # BLD: ABNORMAL 10*3/UL

## 2018-09-24 PROCEDURE — 96361 HYDRATE IV INFUSION ADD-ON: CPT

## 2018-09-24 PROCEDURE — 85025 COMPLETE CBC W/AUTO DIFF WBC: CPT

## 2018-09-24 PROCEDURE — 96374 THER/PROPH/DIAG INJ IV PUSH: CPT

## 2018-09-24 PROCEDURE — 6360000002 HC RX W HCPCS: Performed by: NURSE PRACTITIONER

## 2018-09-24 PROCEDURE — 99283 EMERGENCY DEPT VISIT LOW MDM: CPT

## 2018-09-24 PROCEDURE — 80048 BASIC METABOLIC PNL TOTAL CA: CPT

## 2018-09-24 PROCEDURE — 2580000003 HC RX 258: Performed by: NURSE PRACTITIONER

## 2018-09-24 RX ORDER — 0.9 % SODIUM CHLORIDE 0.9 %
1000 INTRAVENOUS SOLUTION INTRAVENOUS ONCE
Status: COMPLETED | OUTPATIENT
Start: 2018-09-24 | End: 2018-09-24

## 2018-09-24 RX ORDER — SODIUM CHLORIDE 9 MG/ML
INJECTION, SOLUTION INTRAVENOUS CONTINUOUS
Status: DISCONTINUED | OUTPATIENT
Start: 2018-09-24 | End: 2018-09-25 | Stop reason: HOSPADM

## 2018-09-24 RX ORDER — FENTANYL CITRATE 50 UG/ML
50 INJECTION, SOLUTION INTRAMUSCULAR; INTRAVENOUS ONCE
Status: DISCONTINUED | OUTPATIENT
Start: 2018-09-24 | End: 2018-09-25 | Stop reason: HOSPADM

## 2018-09-24 RX ORDER — ONDANSETRON 2 MG/ML
4 INJECTION INTRAMUSCULAR; INTRAVENOUS ONCE
Status: COMPLETED | OUTPATIENT
Start: 2018-09-24 | End: 2018-09-24

## 2018-09-24 RX ADMIN — SODIUM CHLORIDE: 9 INJECTION, SOLUTION INTRAVENOUS at 23:55

## 2018-09-24 RX ADMIN — SODIUM CHLORIDE 1000 ML: 9 INJECTION, SOLUTION INTRAVENOUS at 23:04

## 2018-09-24 RX ADMIN — ONDANSETRON 4 MG: 2 INJECTION INTRAMUSCULAR; INTRAVENOUS at 23:04

## 2018-09-24 ASSESSMENT — ENCOUNTER SYMPTOMS: NAUSEA: 1

## 2018-09-25 RX ORDER — ONDANSETRON 4 MG/1
4 TABLET, ORALLY DISINTEGRATING ORAL EVERY 8 HOURS PRN
Qty: 15 TABLET | Refills: 0 | Status: SHIPPED | OUTPATIENT
Start: 2018-09-25 | End: 2019-02-26 | Stop reason: ALTCHOICE

## 2018-09-25 RX ORDER — OXYCODONE HYDROCHLORIDE AND ACETAMINOPHEN 5; 325 MG/1; MG/1
1 TABLET ORAL EVERY 6 HOURS PRN
Qty: 12 TABLET | Refills: 0 | Status: SHIPPED | OUTPATIENT
Start: 2018-09-25 | End: 2018-09-28

## 2018-09-28 ENCOUNTER — TELEPHONE (OUTPATIENT)
Dept: PHYSICAL MEDICINE AND REHAB | Age: 33
End: 2018-09-28

## 2018-10-01 ENCOUNTER — HOSPITAL ENCOUNTER (OUTPATIENT)
Dept: NEUROLOGY | Age: 33
Discharge: HOME | End: 2018-10-01
Payer: COMMERCIAL

## 2018-10-01 PROCEDURE — 95886 MUSC TEST DONE W/N TEST COMP: CPT | Performed by: PHYSICAL MEDICINE & REHABILITATION

## 2018-10-01 PROCEDURE — 95912 NRV CNDJ TEST 11-12 STUDIES: CPT

## 2018-10-11 ENCOUNTER — OFFICE VISIT (OUTPATIENT)
Dept: NEUROLOGY | Age: 33
End: 2018-10-11
Payer: COMMERCIAL

## 2018-10-11 VITALS
SYSTOLIC BLOOD PRESSURE: 117 MMHG | BODY MASS INDEX: 21.59 KG/M2 | HEIGHT: 70 IN | HEART RATE: 68 BPM | WEIGHT: 150.8 LBS | DIASTOLIC BLOOD PRESSURE: 72 MMHG

## 2018-10-11 DIAGNOSIS — R29.898 BILATERAL LEG WEAKNESS: Primary | ICD-10-CM

## 2018-10-11 PROCEDURE — 99215 OFFICE O/P EST HI 40 MIN: CPT | Performed by: PSYCHIATRY & NEUROLOGY

## 2018-10-11 PROCEDURE — 4004F PT TOBACCO SCREEN RCVD TLK: CPT | Performed by: PSYCHIATRY & NEUROLOGY

## 2018-10-11 PROCEDURE — G8484 FLU IMMUNIZE NO ADMIN: HCPCS | Performed by: PSYCHIATRY & NEUROLOGY

## 2018-10-11 PROCEDURE — G8427 DOCREV CUR MEDS BY ELIG CLIN: HCPCS | Performed by: PSYCHIATRY & NEUROLOGY

## 2018-10-11 PROCEDURE — G8420 CALC BMI NORM PARAMETERS: HCPCS | Performed by: PSYCHIATRY & NEUROLOGY

## 2018-10-11 NOTE — PATIENT INSTRUCTIONS
1. Consider [facility] and [address] [place] [address],[address], once decide, call me, will put referred.   2. Continue exercise, fall precaution    Return as needed    Angelika Fitzpatrick MD, MS

## 2018-10-11 NOTE — PROGRESS NOTES
[address] [facility]            [facility], [address] [facility], [address] [facility]          Dept: [phone]          Dept Fax: [phone]        MD Hermelindo Garcia MD Ahmed B. Twanna Harness, MD Lindell Mars, MD Ltanya Saunders, MD Janet Able, [facility] 70 UP NOTE                                                  PATIENT NAME: Chong Bustillos III   PATIENT MRN: [id number]  FOLLOW UP TODAY: 10/11/2018     Dear Dr. Maisha Thomas MD,     I had the pleasure of seeing your patient Adan Granados, who comes for follow up. CHIEF COMPLAINT: Follow-up and Extremity Weakness       INITIAL & INTERVAL HISTORY:     Chong Bustillos is a 27year old RH WM, I saw him on 6/26/2018, pt returns for followup regarding his bilteral leg weakness. Since last visit, still weak in both legs, difficulty walking, he wears brace on both legs. No issues with arms, underwent LP, EMG, finding against myopathy, peripheral polyneuropathy, or classic motor neuron disorder. Had MRI cervical, thoracic, lumbar, had disc protrusion on thoracic level. No pain/paresthesia but EMG finding suggested chronic multi level radiculopathy. No bowel or urination issues. Initial presentation:    Bilateral leg weakness  Onset: gradually, started more than 1 year ago  Features: bilateral leg weakness, left more than right, both proximal and distal, no pain, walking became difficulty especially when walking long distance. No falls. No neck/back pain/arm or leg pain, no headache, no tingling/numbness, bowel movement and urination are fine. Risk factors: no back or neck injury. Social: single, trying to quit smoking, no drinking, no drugs. 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    Other medical issues: clep 26 - 34 pg Final    MCHC 09/24/2018 35.0  31 - 37 g/dL Final    RDW 09/24/2018 12.9  11.5 - 14.5 % Final    Platelets 77/53/7673 283  130 - 400 k/uL Final    MPV 09/24/2018 6.6  6.0 - 12.0 fL Final    NRBC Automated 09/24/2018 NOT REPORTED  per 100 WBC Final    Differential Type 09/24/2018 NOT REPORTED   Final    Immature Granulocytes 09/24/2018 NOT REPORTED  0 % Final    Absolute Immature Granulocyte 09/24/2018 NOT REPORTED  0.00 - 0.30 k/uL Final    WBC Morphology 09/24/2018 NOT REPORTED   Final    RBC Morphology 09/24/2018 NOT REPORTED   Final    Platelet Estimate 78/78/6353 NOT REPORTED   Final    Seg Neutrophils 09/24/2018 89* 36 - 66 % Final    Lymphocytes 09/24/2018 9* 24 - 44 % Final    Monocytes 09/24/2018 2  1 - 7 % Final    Eosinophils % 09/24/2018 0* 1 - 4 % Final    Basophils 09/24/2018 0  0 - 2 % Final    Segs Absolute 09/24/2018 8.90* 1.8 - 7.7 k/uL Final    Absolute Lymph # 09/24/2018 0.90* 1.0 - 4.8 k/uL Final    Absolute Mono # 09/24/2018 0.20  0.2 - 0.8 k/uL Final    Absolute Eos # 09/24/2018 0.00  0.0 - 0.4 k/uL Final    Basophils # 09/24/2018 0.00  0.0 - 0.2 k/uL Final    Glucose 09/24/2018 119* 70 - 99 mg/dL Final    BUN 09/24/2018 12  6 - 20 mg/dL Final    CREATININE 09/24/2018 0.61* 0.70 - 1.20 mg/dL Final    Bun/Cre Ratio 09/24/2018 20  9 - 20 Final    Calcium 09/24/2018 9.2  8.6 - 10.4 mg/dL Final    Sodium 09/24/2018 138  135 - 144 mmol/L Final    Potassium 09/24/2018 3.7  3.7 - 5.3 mmol/L Final    Chloride 09/24/2018 100  98 - 107 mmol/L Final    CO2 09/24/2018 23  20 - 31 mmol/L Final    Anion Gap 09/24/2018 15  9 - 17 mmol/L Final    GFR Non- 09/24/2018 >60  >60 mL/min Final    GFR  09/24/2018 >60  >60 mL/min Final    GFR Comment 09/24/2018        Final    Comment: Average GFR for 30-36 years old:   80 mL/min/1.73sq m  Chronic Kidney Disease:   <60 mL/min/1.73sq m  Kidney failure:   <15 mL/min/1.73sq m              eGFR

## 2018-10-12 ENCOUNTER — TELEPHONE (OUTPATIENT)
Dept: NEUROLOGY | Age: 33
End: 2018-10-12

## 2018-10-12 DIAGNOSIS — R29.898 BILATERAL LEG WEAKNESS: Primary | ICD-10-CM

## 2019-02-26 ENCOUNTER — OFFICE VISIT (OUTPATIENT)
Dept: FAMILY MEDICINE CLINIC | Age: 34
End: 2019-02-26
Payer: COMMERCIAL

## 2019-02-26 VITALS
HEART RATE: 85 BPM | OXYGEN SATURATION: 97 % | SYSTOLIC BLOOD PRESSURE: 116 MMHG | WEIGHT: 160 LBS | BODY MASS INDEX: 22.9 KG/M2 | DIASTOLIC BLOOD PRESSURE: 66 MMHG | HEIGHT: 70 IN

## 2019-02-26 DIAGNOSIS — R74.8 ELEVATED CPK: ICD-10-CM

## 2019-02-26 DIAGNOSIS — M54.16 SPINAL STENOSIS OF LUMBAR REGION WITH RADICULOPATHY: ICD-10-CM

## 2019-02-26 DIAGNOSIS — M21.372 FOOT DROP, BILATERAL: ICD-10-CM

## 2019-02-26 DIAGNOSIS — R73.9 HYPERGLYCEMIA: ICD-10-CM

## 2019-02-26 DIAGNOSIS — G60.9 NEUROPATHY, PERIPHERAL, HEREDITARY: Primary | ICD-10-CM

## 2019-02-26 DIAGNOSIS — M21.371 FOOT DROP, BILATERAL: ICD-10-CM

## 2019-02-26 DIAGNOSIS — M48.061 SPINAL STENOSIS OF LUMBAR REGION WITH RADICULOPATHY: ICD-10-CM

## 2019-02-26 LAB — HBA1C MFR BLD: 5 %

## 2019-02-26 PROCEDURE — 99214 OFFICE O/P EST MOD 30 MIN: CPT | Performed by: FAMILY MEDICINE

## 2019-02-26 PROCEDURE — G8427 DOCREV CUR MEDS BY ELIG CLIN: HCPCS | Performed by: FAMILY MEDICINE

## 2019-02-26 PROCEDURE — G8484 FLU IMMUNIZE NO ADMIN: HCPCS | Performed by: FAMILY MEDICINE

## 2019-02-26 PROCEDURE — G8420 CALC BMI NORM PARAMETERS: HCPCS | Performed by: FAMILY MEDICINE

## 2019-02-26 PROCEDURE — 4004F PT TOBACCO SCREEN RCVD TLK: CPT | Performed by: FAMILY MEDICINE

## 2019-02-26 PROCEDURE — 83036 HEMOGLOBIN GLYCOSYLATED A1C: CPT | Performed by: FAMILY MEDICINE

## 2019-02-26 RX ORDER — GABAPENTIN 100 MG/1
100 CAPSULE ORAL 2 TIMES DAILY
Qty: 60 CAPSULE | Refills: 0 | Status: SHIPPED | OUTPATIENT
Start: 2019-02-26 | End: 2020-08-17 | Stop reason: ALTCHOICE

## 2019-02-26 ASSESSMENT — PATIENT HEALTH QUESTIONNAIRE - PHQ9
SUM OF ALL RESPONSES TO PHQ QUESTIONS 1-9: 0
2. FEELING DOWN, DEPRESSED OR HOPELESS: 0
1. LITTLE INTEREST OR PLEASURE IN DOING THINGS: 0
SUM OF ALL RESPONSES TO PHQ QUESTIONS 1-9: 0
SUM OF ALL RESPONSES TO PHQ9 QUESTIONS 1 & 2: 0

## 2019-02-26 ASSESSMENT — ENCOUNTER SYMPTOMS
PHOTOPHOBIA: 0
SINUS PRESSURE: 0
BLOOD IN STOOL: 0
DIARRHEA: 0
ABDOMINAL PAIN: 0
CONSTIPATION: 0
SINUS PAIN: 0
ABDOMINAL DISTENTION: 0
RHINORRHEA: 0
WHEEZING: 0
COUGH: 0
EYE REDNESS: 0
SHORTNESS OF BREATH: 0
BACK PAIN: 1

## 2019-10-24 ENCOUNTER — HOSPITAL ENCOUNTER (OUTPATIENT)
Age: 34
Setting detail: SPECIMEN
Discharge: HOME | End: 2019-10-24
Payer: COMMERCIAL

## 2019-10-24 ENCOUNTER — OFFICE VISIT (OUTPATIENT)
Dept: FAMILY MEDICINE CLINIC | Age: 34
End: 2019-10-24
Payer: COMMERCIAL

## 2019-10-24 VITALS
HEIGHT: 70 IN | WEIGHT: 159.8 LBS | HEART RATE: 87 BPM | SYSTOLIC BLOOD PRESSURE: 120 MMHG | DIASTOLIC BLOOD PRESSURE: 74 MMHG | BODY MASS INDEX: 22.88 KG/M2 | OXYGEN SATURATION: 98 %

## 2019-10-24 DIAGNOSIS — Z20.2 EXPOSURE TO STD: ICD-10-CM

## 2019-10-24 DIAGNOSIS — B68.9: ICD-10-CM

## 2019-10-24 DIAGNOSIS — M21.371 FOOT DROP, BILATERAL: ICD-10-CM

## 2019-10-24 DIAGNOSIS — G12.21 ALS (AMYOTROPHIC LATERAL SCLEROSIS) (HCC): Primary | ICD-10-CM

## 2019-10-24 DIAGNOSIS — M21.372 FOOT DROP, BILATERAL: ICD-10-CM

## 2019-10-24 DIAGNOSIS — R21 RASH: ICD-10-CM

## 2019-10-24 DIAGNOSIS — B36.0 TINEA VERSICOLOR: ICD-10-CM

## 2019-10-24 PROCEDURE — 99213 OFFICE O/P EST LOW 20 MIN: CPT | Performed by: FAMILY MEDICINE

## 2019-10-24 RX ORDER — KETOCONAZOLE 20 MG/G
CREAM TOPICAL
Qty: 30 G | Refills: 1 | Status: SHIPPED | OUTPATIENT
Start: 2019-10-24 | End: 2020-08-17 | Stop reason: ALTCHOICE

## 2019-10-24 ASSESSMENT — ENCOUNTER SYMPTOMS
CONSTIPATION: 0
ABDOMINAL DISTENTION: 0
BACK PAIN: 1
SHORTNESS OF BREATH: 0
RHINORRHEA: 0
BLOOD IN STOOL: 0
ABDOMINAL PAIN: 0
WHEEZING: 0
DIARRHEA: 0
COUGH: 0

## 2019-10-25 LAB
C. TRACHOMATIS DNA ,URINE: NEGATIVE
N. GONORRHOEAE DNA, URINE: NEGATIVE
SPECIMEN DESCRIPTION: NORMAL

## 2020-06-04 ENCOUNTER — TELEPHONE (OUTPATIENT)
Dept: FAMILY MEDICINE CLINIC | Age: 35
End: 2020-06-04

## 2020-07-23 ENCOUNTER — TELEPHONE (OUTPATIENT)
Dept: FAMILY MEDICINE CLINIC | Age: 35
End: 2020-07-23

## 2020-07-23 NOTE — TELEPHONE ENCOUNTER
I dont know how to order that . I tried , it does not pull up. Let me know if there is any way to order.

## 2020-07-23 NOTE — TELEPHONE ENCOUNTER
Pt mother is requesting an order for brake and hand assists for his car due to his ALS sent to office of Elisha Gan M.D, fax: [phone]    Contact:  Chiquita Altman, pt mother  [phone]

## 2020-07-27 NOTE — TELEPHONE ENCOUNTER
Mom called back and stated that it would be fine if we made the order and faxed it to Occupational therapy.        Fax # [phone]  Elena Morgan

## 2020-08-17 ENCOUNTER — HOSPITAL ENCOUNTER (OUTPATIENT)
Age: 35
Setting detail: SPECIMEN
Discharge: HOME | End: 2020-08-17
Payer: COMMERCIAL

## 2020-08-17 ENCOUNTER — OFFICE VISIT (OUTPATIENT)
Dept: FAMILY MEDICINE CLINIC | Age: 35
End: 2020-08-17
Payer: COMMERCIAL

## 2020-08-17 VITALS
DIASTOLIC BLOOD PRESSURE: 68 MMHG | OXYGEN SATURATION: 98 % | HEART RATE: 83 BPM | BODY MASS INDEX: 21.81 KG/M2 | TEMPERATURE: 98.2 F | WEIGHT: 152 LBS | SYSTOLIC BLOOD PRESSURE: 110 MMHG

## 2020-08-17 LAB
ABSOLUTE EOS #: 0.1 K/UL (ref 0–0.4)
ABSOLUTE IMMATURE GRANULOCYTE: ABNORMAL K/UL (ref 0–0.3)
ABSOLUTE LYMPH #: 1.4 K/UL (ref 1–4.8)
ABSOLUTE MONO #: 0.4 K/UL (ref 0.1–1.3)
ALBUMIN SERPL-MCNC: 4.2 G/DL (ref 3.5–5.2)
ALBUMIN/GLOBULIN RATIO: ABNORMAL (ref 1–2.5)
ALP BLD-CCNC: 60 U/L (ref 40–129)
ALT SERPL-CCNC: 34 U/L (ref 5–41)
ANION GAP SERPL CALCULATED.3IONS-SCNC: 10 MMOL/L (ref 9–17)
AST SERPL-CCNC: 32 U/L
BASOPHILS # BLD: 1 % (ref 0–2)
BASOPHILS ABSOLUTE: 0 K/UL (ref 0–0.2)
BILIRUB SERPL-MCNC: 0.59 MG/DL (ref 0.3–1.2)
BUN BLDV-MCNC: 16 MG/DL (ref 6–20)
BUN/CREAT BLD: ABNORMAL (ref 9–20)
CALCIUM SERPL-MCNC: 8.6 MG/DL (ref 8.6–10.4)
CHLORIDE BLD-SCNC: 104 MMOL/L (ref 98–107)
CO2: 23 MMOL/L (ref 20–31)
CREAT SERPL-MCNC: <0.4 MG/DL (ref 0.7–1.2)
DIFFERENTIAL TYPE: ABNORMAL
EOSINOPHILS RELATIVE PERCENT: 1 % (ref 0–4)
FOLATE: 6.5 NG/ML
GFR AFRICAN AMERICAN: ABNORMAL ML/MIN
GFR NON-AFRICAN AMERICAN: ABNORMAL ML/MIN
GFR SERPL CREATININE-BSD FRML MDRD: ABNORMAL ML/MIN/{1.73_M2}
GFR SERPL CREATININE-BSD FRML MDRD: ABNORMAL ML/MIN/{1.73_M2}
GLUCOSE BLD-MCNC: 96 MG/DL (ref 70–99)
HCT VFR BLD CALC: 45.1 % (ref 41–53)
HEMOGLOBIN: 15.7 G/DL (ref 13.5–17.5)
IMMATURE GRANULOCYTES: ABNORMAL %
LYMPHOCYTES # BLD: 22 % (ref 24–44)
MCH RBC QN AUTO: 31.9 PG (ref 26–34)
MCHC RBC AUTO-ENTMCNC: 34.9 G/DL (ref 31–37)
MCV RBC AUTO: 91.6 FL (ref 80–100)
MONOCYTES # BLD: 6 % (ref 1–7)
NRBC AUTOMATED: ABNORMAL PER 100 WBC
PDW BLD-RTO: 13.3 % (ref 11.5–14.9)
PLATELET # BLD: 261 K/UL (ref 150–450)
PLATELET ESTIMATE: ABNORMAL
PMV BLD AUTO: 7 FL (ref 6–12)
POTASSIUM SERPL-SCNC: 3.8 MMOL/L (ref 3.7–5.3)
RBC # BLD: 4.93 M/UL (ref 4.5–5.9)
RBC # BLD: ABNORMAL 10*6/UL
SEG NEUTROPHILS: 70 % (ref 36–66)
SEGMENTED NEUTROPHILS ABSOLUTE COUNT: 4.6 K/UL (ref 1.3–9.1)
SODIUM BLD-SCNC: 137 MMOL/L (ref 135–144)
TOTAL PROTEIN: 6.4 G/DL (ref 6.4–8.3)
TSH SERPL DL<=0.05 MIU/L-ACNC: 2.16 MIU/L (ref 0.3–5)
VITAMIN B-12: 548 PG/ML (ref 232–1245)
VITAMIN D 25-HYDROXY: 14.2 NG/ML (ref 30–100)
WBC # BLD: 6.6 K/UL (ref 3.5–11)
WBC # BLD: ABNORMAL 10*3/UL

## 2020-08-17 PROCEDURE — 84443 ASSAY THYROID STIM HORMONE: CPT

## 2020-08-17 PROCEDURE — 99214 OFFICE O/P EST MOD 30 MIN: CPT | Performed by: FAMILY MEDICINE

## 2020-08-17 PROCEDURE — 82607 VITAMIN B-12: CPT

## 2020-08-17 PROCEDURE — 82306 VITAMIN D 25 HYDROXY: CPT

## 2020-08-17 PROCEDURE — 85025 COMPLETE CBC W/AUTO DIFF WBC: CPT

## 2020-08-17 PROCEDURE — 82746 ASSAY OF FOLIC ACID SERUM: CPT

## 2020-08-17 PROCEDURE — 36415 COLL VENOUS BLD VENIPUNCTURE: CPT

## 2020-08-17 PROCEDURE — 80053 COMPREHEN METABOLIC PANEL: CPT

## 2020-08-17 ASSESSMENT — PATIENT HEALTH QUESTIONNAIRE - PHQ9
SUM OF ALL RESPONSES TO PHQ QUESTIONS 1-9: 0
1. LITTLE INTEREST OR PLEASURE IN DOING THINGS: 0
SUM OF ALL RESPONSES TO PHQ QUESTIONS 1-9: 0
SUM OF ALL RESPONSES TO PHQ9 QUESTIONS 1 & 2: 0
2. FEELING DOWN, DEPRESSED OR HOPELESS: 0

## 2020-08-17 ASSESSMENT — ENCOUNTER SYMPTOMS
FACIAL SWELLING: 0
ABDOMINAL DISTENTION: 0
SHORTNESS OF BREATH: 0
BLOOD IN STOOL: 0
ANAL BLEEDING: 0
WHEEZING: 0
CHEST TIGHTNESS: 0
COUGH: 0
BACK PAIN: 1
PHOTOPHOBIA: 0
COLOR CHANGE: 0

## 2020-08-17 NOTE — PROGRESS NOTES
Chronic Disease Visit Information    BP Readings from Last 3 Encounters:   10/24/19 120/74   02/26/19 116/66   10/11/18 117/72          Hemoglobin A1C (%)   Date Value   02/26/2019 5.0     BUN (mg/dL)   Date Value   09/24/2018 12     CREATININE (mg/dL)   Date Value   09/24/2018 0.61 (L)     Glucose (mg/dL)   Date Value   09/24/2018 119 (H)            Have you changed or started any medications since your last visit including any over-the-counter medicines, vitamins, or herbal medicines? no   Are you having any side effects from any of your medications? -  no  Have you stopped taking any of your medications? Is so, why? -  no    Have you seen any other physician or provider since your last visit? No  Have you had any other diagnostic tests since your last visit? No  Have you been seen in the emergency room and/or had an admission to a hospital since we last saw you? No  Have you had your annual diabetic retinal (eye) exam? No  Have you had your routine dental cleaning in the past 6 months? no    Have you activated your [employer] account? If not, what are your barriers?  Yes     Patient Care Team:  Rick Hernandes MD as PCP - General (Family Medicine)  Rick Hernandes MD as PCP - [facility]         Medical History Review  Past Medical, Family, and Social History reviewed and does contribute to the patient presenting condition    Health Maintenance   Topic Date Due    Varicella vaccine (1 of 2 - 2-dose childhood series) 12/15/1984    HIV screen  12/15/1998    Flu vaccine (1) 09/01/2020    DTaP/Tdap/Td vaccine (2 - Td) 10/11/2027    Hepatitis A vaccine  Aged Out    Hepatitis B vaccine  Aged Out    Hib vaccine  Aged Out    Meningococcal (ACWY) vaccine  Aged Out    Pneumococcal 0-64 years Vaccine  Aged Out

## 2020-08-17 NOTE — PROGRESS NOTES
Chief Complaint   Patient presents with    Peripheral Neuropathy         Genna Meigs III  here today for follow up on chronic medical problems, go over labs and/or diagnostic studies, and medication refills. Peripheral Neuropathy      HPI: Patient is here for follow-up on ALS, follows with [facility]. Patient is currently not on any treatment reports he refused because he cannot afford that and he has to take it for only 3 months. Patient reports he has weakness which is getting worse, but denies any respiratory problems, he has PFT done results in care everywhere which is normal.  Patient still works, walks with cane or walker and is also wheelchair-bound. Patient needs help with shower chair and ramp for his car. Patient reports he has difficulty in moving from one place to another. He is also risk for fall. /68   Pulse 83   Temp 98.2 °F (36.8 °C)   Wt 152 lb (68.9 kg)   SpO2 98%   BMI 21.81 kg/m²    Body mass index is 21.81 kg/m². Wt Readings from Last 3 Encounters:   08/17/20 152 lb (68.9 kg)   10/24/19 159 lb 12.8 oz (72.5 kg)   02/26/19 160 lb (72.6 kg)        [x]Negative depression screening. PHQ Scores 8/17/2020 2/26/2019 12/27/2017   PHQ2 Score 0 0 0   PHQ9 Score 0 0 0      []1-4 = Minimal depression   []5-9 = Milddepression   []10-14 = Moderate depression   []15-19 = Moderately severe depression   []20-27 = Severe depression    Discussed testing with the patient and all questions fully answered. Hospital Outpatient Visit on 10/24/2019   Component Date Value Ref Range Status    Specimen Description 10/24/2019 . URINE   Final    C. trachomatis DNA ,Urine 10/24/2019 NEGATIVE  NEGATIVE Final    Comment: CHLAMYDIA TRACHOMATIS DNA not detected by nucleic acid amplification. This test is intended for medical purposes only and is not valid for the evaluation of   suspected sexual abuse or for other forensic purposes.   In certain contexts, culture may be required to meet applicable laws and regulations for   diagnosis of C. trachomatis and N. gonorrhoeae infections. Per 2014  CDC recommendations, this test does not include confirmation of positive results   by an alternative nucleic acid target.  N. gonorrhoeae DNA, Urine 10/24/2019 NEGATIVE  NEGATIVE Final    Comment: NEISSERIA GONORRHOEAE DNA not detected by nucleic acid amplification. This test is intended for medical purposes only and is not valid for the evaluation of   suspected sexual abuse or for other forensic purposes. In certain contexts, culture may be required to meet applicable laws and regulations for   diagnosis of C. trachomatis and N. gonorrhoeae infections. Per 2014  CDC recommendations, this test does not include confirmation of positive results   by an alternative nucleic acid target. Most recent labs reviewed:     Lab Results   Component Value Date    WBC 10.1 09/24/2018    HGB 15.9 09/24/2018    HCT 45.4 09/24/2018    MCV 90.3 09/24/2018     09/24/2018       @BRIEFLAB(NA,K,CL,CO2,BUN,CREATININE,GLUCOSE,CALCIUM)@     Lab Results   Component Value Date    ALT 45 (H) 12/04/2017    AST 32 12/04/2017    ALKPHOS 82 12/04/2017    BILITOT 0.24 (L) 12/04/2017       Lab Results   Component Value Date    TSH 2.16 11/29/2017       No results found for: CHOL  No results found for: TRIG  No results found for: HDL  No results found for: LDLCALC, LDLCHOLESTEROL  No results found for: LABVLDL, VLDL  No results found for: [facility]    Lab Results   Component Value Date    LABA1C 5.0 02/26/2019       Lab Results   Component Value Date    XTASVRGV97 218 11/29/2017       Lab Results   Component Value Date    FOLATE 18.5 11/29/2017       No results found for: IRON, TIBC, FERRITIN    Lab Results   Component Value Date    VITD25 15.5 (L) 11/29/2017             Current Outpatient Medications   Medication Sig Dispense Refill    Misc.  Devices (ADJUST BATH/SHOWER SEAT/BACK) MISC Use daily for taking shower 1 each 0    Misc. Devices (WALL GRAB BAR) MISC Use daily for taking shower 1 each 0    Misc. Devices MISC Please provide ramp for car 1 Device 0    Misc. Devices MISC Please provide shower head 1 Device 0    Misc. Devices MISC Please provide hand brake for car 1 Device 0    Handicap Placard MISC by Does not apply route Expires on 3/1/21 1 each 0     No current facility-administered medications for this visit.               Social History     Socioeconomic History    Marital status: Single     Spouse name: Not on file    Number of children: Not on file    Years of education: Not on file    Highest education level: Not on file   Occupational History    Occupation:      Comment: fulltime   Social Needs    Financial resource strain: Not on file    Food insecurity     Worry: Not on file     Inability: Not on file   [employer] needs     Medical: Not on file     Non-medical: Not on file   Tobacco Use    Smoking status: Current Some Day Smoker     Packs/day: 0.00     Years: 8.00     Pack years: 0.00     Types: Cigarettes     Start date: 2002     Last attempt to quit: 10/20/2019     Years since quittin.8    Smokeless tobacco: Never Used   Substance and Sexual Activity    Alcohol use: No    Drug use: Yes     Types: Marijuana     Comment: occ    Sexual activity: Not Currently   Lifestyle    Physical activity     Days per week: Not on file     Minutes per session: Not on file    Stress: Not on file   Relationships    Social connections     Talks on phone: Not on file     Gets together: Not on file     Attends Buddhism service: Not on file     Active member of club or organization: Not on file     Attends meetings of clubs or organizations: Not on file     Relationship status: Not on file    Intimate partner violence     Fear of current or ex partner: Not on file     Emotionally abused: Not on file     Physically abused: Not on file     Forced sexual activity: Not on file   Other Topics Concern    Not on file   Social History Narrative    Not on file     Ready to quit: Not Answered  Counseling given: Not Answered        Family History   Problem Relation Age of Onset    Diabetes Father              -rest of complaints with corresponding details per ROS    The patient's past medical, surgical, social, and family history as well as his current medications and allergies were reviewed as documented intoday's encounter. Review of Systems   Constitutional: Positive for activity change. Negative for appetite change, chills, diaphoresis, fatigue and unexpected weight change. HENT: Negative for congestion, ear discharge, ear pain, facial swelling, mouth sores, nosebleeds and postnasal drip. Eyes: Negative for photophobia and visual disturbance. Respiratory: Negative for cough, chest tightness, shortness of breath and wheezing. Cardiovascular: Negative for chest pain, palpitations and leg swelling. Gastrointestinal: Negative for abdominal distention, anal bleeding and blood in stool. Genitourinary: Negative for difficulty urinating, frequency and urgency. Musculoskeletal: Positive for arthralgias, back pain and gait problem. Negative for joint swelling, myalgias, neck pain and neck stiffness. Skin: Negative for color change. Neurological: Positive for weakness and numbness. Negative for dizziness, speech difficulty, light-headedness and headaches. Psychiatric/Behavioral: Negative for agitation, decreased concentration, dysphoric mood, hallucinations and sleep disturbance. The patient is not nervous/anxious. Physical Exam  Musculoskeletal:      Right lower leg: He exhibits no tenderness and no swelling. No edema. Left lower leg: He exhibits no tenderness and no bony tenderness. No edema. Neurological:      Mental Status: He is alert and oriented to person, place, and time. Cranial Nerves: Cranial nerves are intact.       Sensory: Sensory deficit present. Motor: Weakness, atrophy and abnormal muscle tone present. Coordination: Coordination is intact. Gait: Gait abnormal.      Deep Tendon Reflexes: Reflexes abnormal.         PHYSICAL EXAM:   VITALS:   Vitals:    08/17/20 1512   BP: 110/68   Pulse: 83   Temp: 98.2 °F (36.8 °C)   SpO2: 98%     GENERAL:  Patient is a well-developed, well-nourished male  in no acute distress, alert and oriented x3, appropriate and pleasant conversation. HEAD: Normocephalic, atraumatic. EYES: Pupils equal, round and reactive to light and accommodation, extraocular   movements intact. ENT: Moist mucous membranes. No erythema is noted. NECK: Supple. No masses. No lymphadenopathy. CARDIOVASCULAR: Regular rate and rhythm. PULMONARY: Lungs are clear to auscultation bilaterally. ABDOMEN: Soft, nontender, nondistended. Positive bowel sounds. ASSESSMENT AND PLAN      1. ALS (amyotrophic lateral sclerosis) ([facility] [address]  Patient will benefit by these devices  - Misc. Devices (ADJUST BATH/SHOWER SEAT/BACK) MISC; Use daily for taking shower  Dispense: 1 each; Refill: 0  - Misc. Devices (WALL GRAB BAR) MISC; Use daily for taking shower  Dispense: 1 each; Refill: 0  - Misc. Devices MISC; Please provide ramp for car  Dispense: 1 Device; Refill: 0  - Misc. Devices MISC; Please provide shower head  Dispense: 1 Device; Refill: 0  - CBC Auto Differential; Future  - Comprehensive Metabolic Panel; Future  - TSH without Reflex; Future  - Vitamin B12 & Folate; Future  - Vitamin D 25 Hydroxy; Future    2. Foot drop, bilateral  Patient will benefit by these devices. Labs are  - Misc. Devices (ADJUST BATH/SHOWER SEAT/BACK) MISC; Use daily for taking shower  Dispense: 1 each; Refill: 0  - Misc. Devices (WALL GRAB BAR) MISC; Use daily for taking shower  Dispense: 1 each; Refill: 0  - Misc. Devices MISC; Please provide ramp for car  Dispense: 1 Device; Refill: 0  - Misc.  Devices MISC; Please provide shower head Dispense: 1 Device; Refill: 0      Orders Placed This Encounter   Procedures    CBC Auto Differential     Standing Status:   Future     Standing Expiration Date:   8/18/2021    Comprehensive Metabolic Panel     Fasting 8 hrs     Standing Status:   Future     Standing Expiration Date:   8/17/2021    TSH without Reflex     Standing Status:   Future     Standing Expiration Date:   8/17/2021    Vitamin B12 & Folate     Standing Status:   Future     Standing Expiration Date:   8/17/2021    Vitamin D 25 Hydroxy     Standing Status:   Future     Standing Expiration Date:   8/17/2021         Medications Discontinued During This Encounter   Medication Reason    gabapentin (NEURONTIN) 100 MG capsule Therapy completed    ketoconazole (NIZORAL) 2 % cream Therapy completed    vitamin D (CHOLECALCIFEROL) 1000 UNIT TABS tablet Therapy completed       Hubert Montana received counseling on the following healthy behaviors: nutrition, exercise and medication adherence  Reviewed prior labs and health maintenance  Continue current medications, diet and exercise. Discussed use, benefit, and side effects of prescribed medications. Barriers to medication compliance addressed. Patient given educational materials - see patient instructions  Was a self-tracking handout given in paper form or via [employer]? Yes    Requested Prescriptions     Signed Prescriptions Disp Refills    Misc. Devices (ADJUST BATH/SHOWER SEAT/BACK) MISC 1 each 0     Sig: Use daily for taking shower    Misc. Devices (WALL GRAB BAR) MISC 1 each 0     Sig: Use daily for taking shower    Misc. Devices MISC 1 Device 0     Sig: Please provide ramp for car    Misc. Devices MISC 1 Device 0     Sig: Please provide shower head       All patient questions answered. Patient voiced understanding. Quality Measures    Body mass index is 21.81 kg/m². Normal. Weight control planned discussed Healthy diet and regular exercise.     BP: 110/68 Blood pressure is normal. Treatment

## 2020-08-18 ENCOUNTER — TELEPHONE (OUTPATIENT)
Dept: FAMILY MEDICINE CLINIC | Age: 35
End: 2020-08-18

## 2020-08-18 RX ORDER — ERGOCALCIFEROL 1.25 MG/1
50000 CAPSULE ORAL WEEKLY
Qty: 12 CAPSULE | Refills: 1 | Status: SHIPPED | OUTPATIENT
Start: 2020-08-18 | End: 2021-02-01 | Stop reason: SDUPTHER

## 2020-10-16 ENCOUNTER — TELEPHONE (OUTPATIENT)
Dept: FAMILY MEDICINE CLINIC | Age: 35
End: 2020-10-16

## 2020-10-16 NOTE — TELEPHONE ENCOUNTER
Called patient about request for insurance referral we received form [facility] .  Not able to leave a message , we need some more information about this referral : who is referring provider ?if patient is aware of  Referral?

## 2021-02-01 ENCOUNTER — OFFICE VISIT (OUTPATIENT)
Dept: FAMILY MEDICINE CLINIC | Age: 36
End: 2021-02-01
Payer: COMMERCIAL

## 2021-02-01 VITALS
OXYGEN SATURATION: 98 % | WEIGHT: 151 LBS | DIASTOLIC BLOOD PRESSURE: 78 MMHG | HEIGHT: 70 IN | TEMPERATURE: 98.4 F | SYSTOLIC BLOOD PRESSURE: 110 MMHG | HEART RATE: 74 BPM | BODY MASS INDEX: 21.62 KG/M2

## 2021-02-01 DIAGNOSIS — Z00.00 PREVENTATIVE HEALTH CARE: ICD-10-CM

## 2021-02-01 DIAGNOSIS — E55.9 VITAMIN D DEFICIENCY: ICD-10-CM

## 2021-02-01 DIAGNOSIS — M21.372 FOOT DROP, BILATERAL: ICD-10-CM

## 2021-02-01 DIAGNOSIS — M21.371 FOOT DROP, BILATERAL: ICD-10-CM

## 2021-02-01 DIAGNOSIS — G12.21 ALS (AMYOTROPHIC LATERAL SCLEROSIS) (HCC): Primary | ICD-10-CM

## 2021-02-01 DIAGNOSIS — R29.898 WEAKNESS OF BOTH LOWER EXTREMITIES: ICD-10-CM

## 2021-02-01 DIAGNOSIS — G60.9 NEUROPATHY, PERIPHERAL, HEREDITARY: ICD-10-CM

## 2021-02-01 PROCEDURE — 99214 OFFICE O/P EST MOD 30 MIN: CPT | Performed by: FAMILY MEDICINE

## 2021-02-01 RX ORDER — ERGOCALCIFEROL 1.25 MG/1
50000 CAPSULE ORAL WEEKLY
Qty: 12 CAPSULE | Refills: 1 | Status: SHIPPED | OUTPATIENT
Start: 2021-02-01 | End: 2021-11-10 | Stop reason: SDUPTHER

## 2021-02-01 ASSESSMENT — ENCOUNTER SYMPTOMS
BACK PAIN: 1
RHINORRHEA: 0
NAUSEA: 0
COUGH: 0
WHEEZING: 0
DIARRHEA: 0
CHEST TIGHTNESS: 0
PHOTOPHOBIA: 0
ABDOMINAL DISTENTION: 0
BLOOD IN STOOL: 0
SHORTNESS OF BREATH: 0
CONSTIPATION: 0
SINUS PAIN: 0
ABDOMINAL PAIN: 0

## 2021-02-01 ASSESSMENT — PATIENT HEALTH QUESTIONNAIRE - PHQ9
SUM OF ALL RESPONSES TO PHQ QUESTIONS 1-9: 0
1. LITTLE INTEREST OR PLEASURE IN DOING THINGS: 0

## 2021-02-01 NOTE — PROGRESS NOTES
Chief Complaint   Patient presents with    Peripheral Neuropathy    Other     ALS    Forms         Antwan Klein III  here today for follow up on chronic medical problems, go over labs and/or diagnostic studies, and medication refills. Peripheral Neuropathy, Other (ALS), and Forms      HPI: Patient is here for to fill out disability papers for short-term. Patient reports he is not able to function at work. He has history of ALS reports it is getting worse. 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 Patient reports he uses scooter there but is not able to walk. He has to use cane or walker. Patient is planning to apply for long-term disability but currently needs papers to be filled out. Foot drop is same no improvement. Patient was seen at [facility] . /78   Pulse 74   Temp 98.4 °F (36.9 °C) (Temporal)   Ht 5' 10\" (1.778 m)   Wt 151 lb (68.5 kg)   SpO2 98%   BMI 21.67 kg/m²    Body mass index is 21.67 kg/m². Wt Readings from Last 3 Encounters:   02/01/21 151 lb (68.5 kg)   08/17/20 152 lb (68.9 kg)   10/24/19 159 lb 12.8 oz (72.5 kg)        [x]Negative depression screening. PHQ Scores 2/1/2021 8/17/2020 2/26/2019 12/27/2017   PHQ2 Score 0 0 0 0   PHQ9 Score 0 0 0 0      []1-4 = Minimal depression   []5-9 = Milddepression   []10-14 = Moderate depression   []15-19 = Moderately severe depression   []20-27 = Severe depression    Discussed testing with the patient and all questions fully answered.     Hospital Outpatient Visit on 08/17/2020   Component Date Value Ref Range Status    WBC 08/17/2020 6.6  3.5 - 11.0 k/uL Final    RBC 08/17/2020 4.93  4.5 - 5.9 m/uL Final    Hemoglobin 08/17/2020 15.7  13.5 - 17.5 g/dL Final    Hematocrit 08/17/2020 45.1  41 - 53 % Final    MCV 08/17/2020 91.6  80 - 100 fL Final    MCH 08/17/2020 31.9  26 - 34 pg Final    MCHC 08/17/2020 34.9  31 - 37 g/dL Final    RDW 08/17/2020 13.3  11.5 - 14.9 % Final    Platelets 60/08/3452 261  150 - 450 k/uL Final    MPV 08/17/2020 7.0  6.0 - 12.0 fL Final    NRBC Automated 08/17/2020 NOT REPORTED  per 100 WBC Final    Differential Type 08/17/2020 NOT REPORTED   Final    Seg Neutrophils 08/17/2020 70* 36 - 66 % Final    Lymphocytes 08/17/2020 22* 24 - 44 % Final    Monocytes 08/17/2020 6  1 - 7 % Final    Eosinophils % 08/17/2020 1  0 - 4 % Final    Basophils 08/17/2020 1  0 - 2 % Final    Immature Granulocytes 08/17/2020 NOT REPORTED  0 % Final    Segs Absolute 08/17/2020 4.60  1.3 - 9.1 k/uL Final    Absolute Lymph # 08/17/2020 1.40  1.0 - 4.8 k/uL Final    Absolute Mono # 08/17/2020 0.40  0.1 - 1.3 k/uL Final    Absolute Eos # 08/17/2020 0.10  0.0 - 0.4 k/uL Final    Basophils Absolute 08/17/2020 0.00  0.0 - 0.2 k/uL Final    Absolute Immature Granulocyte 08/17/2020 NOT REPORTED  0.00 - 0.30 k/uL Final    WBC Morphology 08/17/2020 NOT REPORTED   Final    RBC Morphology 08/17/2020 NOT REPORTED   Final    Platelet Estimate 18/17/5358 NOT REPORTED   Final    Glucose 08/17/2020 96  70 - 99 mg/dL Final    BUN 08/17/2020 16  6 - 20 mg/dL Final    CREATININE 08/17/2020 <0.40* 0.70 - 1.20 mg/dL Final    Bun/Cre Ratio 08/17/2020 NOT REPORTED  9 - 20 Final    Calcium 08/17/2020 8.6  8.6 - 10.4 mg/dL Final    Sodium 08/17/2020 137  135 - 144 mmol/L Final    Potassium 08/17/2020 3.8  3.7 - 5.3 mmol/L Final    Chloride 08/17/2020 104  98 - 107 mmol/L Final    CO2 08/17/2020 23  20 - 31 mmol/L Final    Anion Gap 08/17/2020 10  9 - 17 mmol/L Final    Alkaline Phosphatase 08/17/2020 60  40 - 129 U/L Final    ALT 08/17/2020 34  5 - 41 U/L Final    AST 08/17/2020 32  <40 U/L Final    Total Bilirubin 08/17/2020 0.59  0.3 - 1.2 mg/dL Final    Total Protein 08/17/2020 6.4  6.4 - 8.3 g/dL Final    Albumin 08/17/2020 4.2  3.5 - 5.2 g/dL Final    Albumin/Globulin Ratio 08/17/2020 NOT REPORTED  1.0 - 2.5 Final    GFR Non- 08/17/2020 CANNOT Medication Sig Dispense Refill    vitamin D (ERGOCALCIFEROL) 1.25 MG (56223 UT) CAPS capsule Take 1 capsule by mouth once a week 12 capsule 1    Misc. Devices (ADJUST BATH/SHOWER SEAT/BACK) MISC Use daily for taking shower 1 each 0    Misc. Devices (WALL GRAB BAR) MISC Use daily for taking shower 1 each 0    Misc. Devices MISC Please provide ramp for car 1 Device 0    Misc. Devices MISC Please provide shower head 1 Device 0    Misc. Devices MISC Please provide hand brake for car 1 Device 0    Handicap Placard MISC by Does not apply route Expires on 3/1/21 1 each 0     No current facility-administered medications for this visit.               Social History     Socioeconomic History    Marital status: Single     Spouse name: Not on file    Number of children: Not on file    Years of education: Not on file    Highest education level: Not on file   Occupational History    Occupation:      Comment: fulltime   Social Needs    Financial resource strain: Not on file    Food insecurity     Worry: Not on file     Inability: Not on file   [employer] needs     Medical: Not on file     Non-medical: Not on file   Tobacco Use    Smoking status: Current Some Day Smoker     Packs/day: 0.00     Years: 8.00     Pack years: 0.00     Types: Cigarettes     Start date: 2002     Last attempt to quit: 10/20/2019     Years since quittin.2    Smokeless tobacco: Never Used   Substance and Sexual Activity    Alcohol use: No    Drug use: Yes     Types: Marijuana     Comment: occ    Sexual activity: Not Currently   Lifestyle    Physical activity     Days per week: Not on file     Minutes per session: Not on file    Stress: Not on file   Relationships    Social connections     Talks on phone: Not on file     Gets together: Not on file     Attends [employer] service: Not on file     Active member of club or organization: Not on file     Attends meetings of clubs or organizations: Not on file Relationship status: Not on file    Intimate partner violence     Fear of current or ex partner: Not on file     Emotionally abused: Not on file     Physically abused: Not on file     Forced sexual activity: Not on file   Other Topics Concern    Not on file   Social History Narrative    Not on file     Ready to quit: Yes  Counseling given: Yes        Family History   Problem Relation Age of Onset    Diabetes Father              -rest of complaints with corresponding details per ROS    The patient's past medical, surgical, social, and family history as well as his current medications and allergies were reviewed as documented intoday's encounter. Review of Systems   Constitutional: Positive for activity change. Negative for appetite change, chills, diaphoresis, fatigue and unexpected weight change. HENT: Negative for congestion, hearing loss, mouth sores, postnasal drip, rhinorrhea and sinus pain. Eyes: Negative for photophobia and visual disturbance. Respiratory: Negative for cough, chest tightness, shortness of breath and wheezing. Cardiovascular: Negative for chest pain, palpitations and leg swelling. Gastrointestinal: Negative for abdominal distention, abdominal pain, blood in stool, constipation, diarrhea and nausea. Genitourinary: Negative for difficulty urinating, flank pain, frequency, penile pain, testicular pain and urgency. Musculoskeletal: Positive for arthralgias, back pain, gait problem, myalgias and neck pain. Negative for joint swelling and neck stiffness. Neurological: Positive for weakness and numbness. Negative for dizziness, speech difficulty, light-headedness and headaches. Psychiatric/Behavioral: Negative for agitation, behavioral problems, decreased concentration, hallucinations and sleep disturbance. The patient is nervous/anxious. Physical Exam  Vitals signs and nursing note reviewed. Constitutional:       Appearance: Normal appearance.    HENT: Head: Normocephalic and atraumatic. Nose: Nose normal.      Mouth/Throat:      Mouth: Mucous membranes are moist.   Eyes:      Extraocular Movements: Extraocular movements intact. Pupils: Pupils are equal, round, and reactive to light. Neck:      Musculoskeletal: Normal range of motion. Cardiovascular:      Rate and Rhythm: Normal rate and regular rhythm. Heart sounds: Normal heart sounds. Pulmonary:      Effort: Pulmonary effort is normal.      Breath sounds: Normal breath sounds. No wheezing. Abdominal:      General: Bowel sounds are normal. There is no distension. Palpations: Abdomen is soft. There is no mass. Tenderness: There is no abdominal tenderness. There is no guarding or rebound. Musculoskeletal:      Right foot: Decreased range of motion. Deformity and foot drop present. Left foot: Decreased range of motion. Deformity and foot drop present. Neurological:      Mental Status: He is alert and oriented to person, place, and time. Cranial Nerves: Cranial nerves are intact. No facial asymmetry. Sensory: Sensory deficit present. Motor: Weakness and abnormal muscle tone present. Coordination: Romberg sign positive. Gait: Gait abnormal.      Deep Tendon Reflexes: Reflexes abnormal.   Psychiatric:         Attention and Perception: Attention and perception normal.         Mood and Affect: Affect normal. Mood is anxious. Speech: Speech normal.             ASSESSMENT AND PLAN      1. ALS (amyotrophic lateral sclerosis) ([facility] [address]  Patient is up-to-date on blood work. Disability papers filled for short-term. 2. Weakness of both lower extremities  Paper does not need any testing to fill out disability papers. 3. Foot drop, bilateral  Disability papers filled    4. Neuropathy, peripheral, hereditary      5. Vitamin D deficiency    - vitamin D (ERGOCALCIFEROL) 1.25 MG (94878 UT) CAPS capsule;  Take 1 capsule by mouth once a week  Dispense: 12

## 2021-03-31 ENCOUNTER — TELEPHONE (OUTPATIENT)
Dept: FAMILY MEDICINE CLINIC | Age: 36
End: 2021-03-31

## 2021-04-01 ENCOUNTER — OFFICE VISIT (OUTPATIENT)
Dept: FAMILY MEDICINE CLINIC | Age: 36
End: 2021-04-01
Payer: COMMERCIAL

## 2021-04-01 VITALS
SYSTOLIC BLOOD PRESSURE: 118 MMHG | TEMPERATURE: 97.3 F | HEART RATE: 74 BPM | OXYGEN SATURATION: 98 % | HEIGHT: 70 IN | DIASTOLIC BLOOD PRESSURE: 72 MMHG | BODY MASS INDEX: 21.67 KG/M2 | WEIGHT: 151.4 LBS

## 2021-04-01 DIAGNOSIS — G12.21 ALS (AMYOTROPHIC LATERAL SCLEROSIS) (HCC): Primary | ICD-10-CM

## 2021-04-01 PROBLEM — G62.89: Status: ACTIVE | Noted: 2018-11-05

## 2021-04-01 PROBLEM — J96.10: Status: ACTIVE | Noted: 2020-08-04

## 2021-04-01 PROBLEM — R13.10 DYSPHAGIA: Status: ACTIVE | Noted: 2019-09-19

## 2021-04-01 PROBLEM — R29.6 FALLS FREQUENTLY: Status: ACTIVE | Noted: 2020-08-04

## 2021-04-01 PROCEDURE — 99213 OFFICE O/P EST LOW 20 MIN: CPT | Performed by: NURSE PRACTITIONER

## 2021-04-01 ASSESSMENT — PATIENT HEALTH QUESTIONNAIRE - PHQ9
SUM OF ALL RESPONSES TO PHQ QUESTIONS 1-9: 0
SUM OF ALL RESPONSES TO PHQ QUESTIONS 1-9: 0

## 2021-04-01 ASSESSMENT — ENCOUNTER SYMPTOMS
WHEEZING: 0
COUGH: 0
SHORTNESS OF BREATH: 0
RESPIRATORY NEGATIVE: 1
GASTROINTESTINAL NEGATIVE: 1

## 2021-04-01 NOTE — PROGRESS NOTES
[address]  [place] [place] [address]   [address] [address]  Dept: [phone]  Dept Fax: [phone]    Darwyn Romberg is a 38 y.o. male who presents today for his medical conditions/complaintsas noted below. Yomi Motto III is c/o of   Chief Complaint   Patient presents with    Other     DISABILITY PAPERWORK         HPI:     [employer]  [place] is here today regarding his short term disability. He is working with neurology, PT, nutrition at [email or web address] for his ALS. He was told by [employer] that he would need further paperwork completed for the disability. He has been diagnosed x 2 years. Progressively getting weaker. Bilateral orthotics, use of wheelchair and cane. Is unable to perform job as .      Hemoglobin A1C (%)   Date Value   2019 5.0             ( goal A1Cis < 7)   No results found for: LABMICR  No results found for: LDLCHOLESTEROL, LDLCALC    (goal LDL is <100)   AST (U/L)   Date Value   2020 32     ALT (U/L)   Date Value   2020 34     BUN (mg/dL)   Date Value   2020 16     BP Readings from Last 3 Encounters:   21 118/72   21 110/78   20 110/68          (goal 120/80)    Past Medical History:   Diagnosis Date    ALS (amyotrophic lateral sclerosis) (HCC)     Cleft lip     Foot drop, bilateral       Past Surgical History:   Procedure Laterality Date    APPENDECTOMY      CLEFT LIP REPAIR      MANDIBLE SURGERY         Family History   Problem Relation Age of Onset    Diabetes Father        Social History     Tobacco Use    Smoking status: Current Some Day Smoker     Packs/day: 0.00     Years: 8.00     Pack years: 0.00     Types: Cigarettes     Start date: 2002     Last attempt to quit: 10/20/2019     Years since quittin.4    Smokeless tobacco: Never Used   Substance Use Topics    Alcohol use: No      Current Outpatient Medications   Medication Sig Dispense Refill    vitamin D Constitutional:       Appearance: He is well-developed. HENT:      Head: Normocephalic and atraumatic. Neck:      Musculoskeletal: Normal range of motion. Cardiovascular:      Rate and Rhythm: Normal rate and regular rhythm. Heart sounds: Normal heart sounds. No murmur. Pulmonary:      Effort: Pulmonary effort is normal. No respiratory distress. Breath sounds: Normal breath sounds. Abdominal:      General: Bowel sounds are normal.      Palpations: Abdomen is soft. Musculoskeletal:         General: Deformity present. Skin:     General: Skin is warm and dry. Neurological:      Mental Status: He is alert and oriented to person, place, and time. Psychiatric:         Mood and Affect: Mood normal.         Behavior: Behavior normal.         Thought Content: Thought content normal.         Judgment: Judgment normal.       /72 (Site: Left Upper Arm)   Pulse 74   Temp 97.3 °F (36.3 °C)   Ht 5' 10\" (1.778 m)   Wt 151 lb 6.4 oz (68.7 kg)   SpO2 98%   BMI 21.72 kg/m²     Assessment:       Diagnosis Orders   1. [facility]               Plan:      Return in about 6 months (around 10/1/2021) for als, routine visit. Owen Ramirez will check with [facility] PT dept that he has been working with. Thinking they may be able to better assist him since they are familiar with his case. If he needs us to order the FCT, encouraged to call back so we can do this. (Form is in my box if needed)    No other needs today. No orders of the defined types were placed in this encounter. No orders of the defined types were placed in this encounter. Patient given educational materials - see patient instructions. Discussed use, benefit, and side effects of prescribed medications. All patientquestions answered. Pt voiced understanding. Reviewed health maintenance. Instructedto continue current medications, diet and exercise. Patient agreed with treatmentplan. Follow up as directed. Electronically signed by DAPHNIE Birmingham CNP on 4/1/2021 at 1:38 PM

## 2021-04-01 NOTE — PROGRESS NOTES
Visit Information    Have you changed or started any medications since your last visit including any over-the-counter medicines, vitamins, or herbal medicines? no   Have you stopped taking any of your medications? Is so, why? -  no  Are you having any side effects from any of your medications? - no    Have you seen any other physician or provider since your last visit? yes - NEUROLOGY   Have you had any other diagnostic tests since your last visit?  no   Have you been seen in the emergency room and/or had an admission in a hospital since we last saw you?  no   Have you had your routine dental cleaning in the past 6 months?  no     Do you have an active MyChart account? If no, what is the barrier?   Yes    Patient Care Team:  Lois Quinn MD as PCP - General (Family Medicine)  Lois Quinn MD as PCP - [facility]    Medical History Review  Past Medical, Family, and Social History reviewed and does contribute to the patient presenting condition    Health Maintenance   Topic Date Due    Hepatitis C screen  Never done    Varicella vaccine (1 of 2 - 2-dose childhood series) Never done    HIV screen  Never done    COVID-19 Vaccine (1) Never done    Flu vaccine (Season Ended) 09/01/2021    DTaP/Tdap/Td vaccine (2 - Td) 10/11/2027    Pneumococcal 0-64 years Vaccine  Completed    Hepatitis A vaccine  Aged Out    Hepatitis B vaccine  Aged Out    Hib vaccine  Aged Out    Meningococcal (ACWY) vaccine  Aged Out

## 2021-11-10 ENCOUNTER — OFFICE VISIT (OUTPATIENT)
Dept: FAMILY MEDICINE CLINIC | Age: 36
End: 2021-11-10
Payer: MEDICARE

## 2021-11-10 VITALS
DIASTOLIC BLOOD PRESSURE: 76 MMHG | BODY MASS INDEX: 20.76 KG/M2 | WEIGHT: 145 LBS | OXYGEN SATURATION: 98 % | SYSTOLIC BLOOD PRESSURE: 102 MMHG | TEMPERATURE: 97.8 F | HEART RATE: 77 BPM | HEIGHT: 70 IN

## 2021-11-10 DIAGNOSIS — G12.21 ALS (AMYOTROPHIC LATERAL SCLEROSIS) (HCC): Primary | ICD-10-CM

## 2021-11-10 DIAGNOSIS — E55.9 VITAMIN D DEFICIENCY: ICD-10-CM

## 2021-11-10 DIAGNOSIS — R29.898 WEAKNESS OF BOTH LOWER EXTREMITIES: ICD-10-CM

## 2021-11-10 DIAGNOSIS — J96.10 CHRONIC NEUROMUSCULAR RESPIRATORY FAILURE (HCC): ICD-10-CM

## 2021-11-10 DIAGNOSIS — J98.4 RESTRICTIVE LUNG DISEASE: ICD-10-CM

## 2021-11-10 PROCEDURE — G8420 CALC BMI NORM PARAMETERS: HCPCS | Performed by: FAMILY MEDICINE

## 2021-11-10 PROCEDURE — 99214 OFFICE O/P EST MOD 30 MIN: CPT | Performed by: FAMILY MEDICINE

## 2021-11-10 PROCEDURE — G8484 FLU IMMUNIZE NO ADMIN: HCPCS | Performed by: FAMILY MEDICINE

## 2021-11-10 PROCEDURE — G8427 DOCREV CUR MEDS BY ELIG CLIN: HCPCS | Performed by: FAMILY MEDICINE

## 2021-11-10 PROCEDURE — 4004F PT TOBACCO SCREEN RCVD TLK: CPT | Performed by: FAMILY MEDICINE

## 2021-11-10 RX ORDER — ERGOCALCIFEROL 1.25 MG/1
50000 CAPSULE ORAL WEEKLY
Qty: 12 CAPSULE | Refills: 1 | Status: SHIPPED | OUTPATIENT
Start: 2021-11-10

## 2021-11-10 ASSESSMENT — ENCOUNTER SYMPTOMS
SINUS PRESSURE: 0
PHOTOPHOBIA: 0
SHORTNESS OF BREATH: 0
RHINORRHEA: 0
ABDOMINAL DISTENTION: 0
CONSTIPATION: 0
CHEST TIGHTNESS: 0
BLOOD IN STOOL: 0
WHEEZING: 0
COUGH: 0

## 2021-11-10 NOTE — PROGRESS NOTES
Visit Information    Have you changed or started any medications since your last visit including any over-the-counter medicines, vitamins, or herbal medicines? no   Have you stopped taking any of your medications? Is so, why? -  no  Are you having any side effects from any of your medications? - no    Have you seen any other physician or provider since your last visit? yes -    Have you had any other diagnostic tests since your last visit?  no   Have you been seen in the emergency room and/or had an admission in a hospital since we last saw you?  no   Have you had your routine dental cleaning in the past 6 months?  no     Do you have an active MyChart account? If no, what is the barrier?   Yes    Patient Care Team:  Nakia Nieto MD as PCP - General (Family Medicine)  Nakia Nieto MD as PCP - [facility]    Medical History Review  Past Medical, Family, and Social History reviewed and does contribute to the patient presenting condition    Health Maintenance   Topic Date Due    Hepatitis C screen  Never done    Varicella vaccine (1 of 2 - 2-dose childhood series) Never done    HIV screen  Never done    Flu vaccine (1) Never done    DTaP/Tdap/Td vaccine (2 - Td or Tdap) 10/11/2027    Pneumococcal 0-64 years Vaccine (2 of 2 - PPSV23) 12/15/2048    COVID-19 Vaccine  Completed    Hepatitis A vaccine  Aged Out    Hepatitis B vaccine  Aged Out    Hib vaccine  Aged Out    Meningococcal (ACWY) vaccine  Aged Out

## 2021-11-10 NOTE — PROGRESS NOTES
Chief Complaint   Patient presents with    Other     ALS     Immunizations     NO TO FLU VACCINE         Verito Baker III  here today for follow up on chronic medical problems, go over labs and/or diagnostic studies, and medication refills. Other (ALS ) and Immunizations (NO TO FLU VACCINE)      HPI: Patient is here for follow-up on illness, follows with [facility]. Notes reviewed from [place]. Patient is not on any medications and stopped taking vitamin D did not refill. Patient reports his disease is progressing he has noticed weakness of upper extremities, is wheelchair-bound. He has been across home health who comes 3 days in a week helps him in taking shower and also with home needs. Patient also has nutritionist at [place]. PFT done that showed mild restrictive lung disease. ALS related symptoms:  -Excessive saliva: no concerns  -Cramps: no concerns  -Pain: no concerns  -Pseudobulbar affect: no concerns  -Depression/mood changes: no concerns       Spirometry suggests a mild restrictive ventilatory defect, although a TLC measurement would be required for confirmation, if clinically indicated. MUSCLE FORCES: Decreased inspiratory muscle forces. OXIMETRY: Normal oxygen saturation on room air at rest.  COMPARISON: Decreased function since 2/19/2020. /76   Pulse 77   Temp 97.8 °F (36.6 °C)   Ht 5' 10\" (1.778 m)   Wt 145 lb (65.8 kg)   SpO2 98%   BMI 20.81 kg/m²    Body mass index is 20.81 kg/m². Wt Readings from Last 3 Encounters:   11/10/21 145 lb (65.8 kg)   04/01/21 151 lb 6.4 oz (68.7 kg)   02/01/21 151 lb (68.5 kg)        [x]Negative depression screening.   PHQ Scores 4/1/2021 2/1/2021 8/17/2020 2/26/2019 12/27/2017   PHQ2 Score 0 0 0 0 0   PHQ9 Score 0 0 0 0 0      []1-4 = Minimal depression   []5-9 = Milddepression   []10-14 = Moderate depression   []15-19 = Moderately severe depression   []20-27 = Severe depression    Discussed testing with the patient and all questions fully answered.     Hospital Outpatient Visit on 08/17/2020   Component Date Value Ref Range Status    WBC 08/17/2020 6.6  3.5 - 11.0 k/uL Final    RBC 08/17/2020 4.93  4.5 - 5.9 m/uL Final    Hemoglobin 08/17/2020 15.7  13.5 - 17.5 g/dL Final    Hematocrit 08/17/2020 45.1  41 - 53 % Final    MCV 08/17/2020 91.6  80 - 100 fL Final    MCH 08/17/2020 31.9  26 - 34 pg Final    MCHC 08/17/2020 34.9  31 - 37 g/dL Final    RDW 08/17/2020 13.3  11.5 - 14.9 % Final    Platelets 07/29/9408 261  150 - 450 k/uL Final    MPV 08/17/2020 7.0  6.0 - 12.0 fL Final    NRBC Automated 08/17/2020 NOT REPORTED  per 100 WBC Final    Differential Type 08/17/2020 NOT REPORTED   Final    Seg Neutrophils 08/17/2020 70* 36 - 66 % Final    Lymphocytes 08/17/2020 22* 24 - 44 % Final    Monocytes 08/17/2020 6  1 - 7 % Final    Eosinophils % 08/17/2020 1  0 - 4 % Final    Basophils 08/17/2020 1  0 - 2 % Final    Immature Granulocytes 08/17/2020 NOT REPORTED  0 % Final    Segs Absolute 08/17/2020 4.60  1.3 - 9.1 k/uL Final    Absolute Lymph # 08/17/2020 1.40  1.0 - 4.8 k/uL Final    Absolute Mono # 08/17/2020 0.40  0.1 - 1.3 k/uL Final    Absolute Eos # 08/17/2020 0.10  0.0 - 0.4 k/uL Final    Basophils Absolute 08/17/2020 0.00  0.0 - 0.2 k/uL Final    Absolute Immature Granulocyte 08/17/2020 NOT REPORTED  0.00 - 0.30 k/uL Final    WBC Morphology 08/17/2020 NOT REPORTED   Final    RBC Morphology 08/17/2020 NOT REPORTED   Final    Platelet Estimate 97/69/4546 NOT REPORTED   Final    Glucose 08/17/2020 96  70 - 99 mg/dL Final    BUN 08/17/2020 16  6 - 20 mg/dL Final    CREATININE 08/17/2020 <0.40* 0.70 - 1.20 mg/dL Final    Bun/Cre Ratio 08/17/2020 NOT REPORTED  9 - 20 Final    Calcium 08/17/2020 8.6  8.6 - 10.4 mg/dL Final    Sodium 08/17/2020 137  135 - 144 mmol/L Final    Potassium 08/17/2020 3.8  3.7 - 5.3 mmol/L Final    Chloride 08/17/2020 104  98 - 107 mmol/L Final    CO2 08/17/2020 23  20 - 31 mmol/L Final    Anion Gap 08/17/2020 10  9 - 17 mmol/L Final    Alkaline Phosphatase 08/17/2020 60  40 - 129 U/L Final    ALT 08/17/2020 34  5 - 41 U/L Final    AST 08/17/2020 32  <40 U/L Final    Total Bilirubin 08/17/2020 0.59  0.3 - 1.2 mg/dL Final    Total Protein 08/17/2020 6.4  6.4 - 8.3 g/dL Final    Albumin 08/17/2020 4.2  3.5 - 5.2 g/dL Final    Albumin/Globulin Ratio 08/17/2020 NOT REPORTED  1.0 - 2.5 Final    GFR Non- 08/17/2020 CANNOT BE CALCULATED  >60 mL/min Final    GFR  08/17/2020 CANNOT BE CALCULATED  >60 mL/min Final    GFR Comment 08/17/2020        Final    Comment: Average GFR for 30-36 years old:   80 mL/min/1.73sq m  Chronic Kidney Disease:   <60 mL/min/1.73sq m  Kidney failure:   <15 mL/min/1.73sq m              eGFR calculated using average adult body mass.  Additional eGFR calculator available at:        [email or web address]            GFR Staging 08/17/2020 NOT REPORTED   Final    TSH 08/17/2020 2.16  0.30 - 5.00 mIU/L Final    Vitamin B-12 08/17/2020 548  232 - 1245 pg/mL Final    Folate 08/17/2020 6.5  >4.8 ng/mL Final    Vit D, 25-Hydroxy 08/17/2020 14.2* 30.0 - 100.0 ng/mL Final    Comment:    Reference Range:  Vitamin D status         Range   Deficiency              <20 ng/mL   Mild Deficiency       20-30 ng/mL   Sufficiency           ng/mL   Toxicity               >100 ng/mL           Most recent labs reviewed:     Lab Results   Component Value Date    WBC 6.6 08/17/2020    HGB 15.7 08/17/2020    HCT 45.1 08/17/2020    MCV 91.6 08/17/2020     08/17/2020       @BRIEFLAB(NA,K,CL,CO2,BUN,CREATININE,GLUCOSE,CALCIUM)@     Lab Results   Component Value Date    ALT 34 08/17/2020    AST 32 08/17/2020    ALKPHOS 60 08/17/2020    BILITOT 0.59 08/17/2020       Lab Results   Component Value Date    TSH 2.16 08/17/2020       No results found for: CHOL  No results found for: TRIG  No results found for: HDL  No results found for: Rand Tee  No results found for: LABVLDL, VLDL  No results found for: [facility]    Lab Results   Component Value Date    LABA1C 5.0 2019       Lab Results   Component Value Date    JXLDXBSC25 548 2020       Lab Results   Component Value Date    FOLATE 6.5 2020       No results found for: IRON, TIBC, FERRITIN    Lab Results   Component Value Date    VITD25 14.2 (L) 2020             Current Outpatient Medications   Medication Sig Dispense Refill    vitamin D (ERGOCALCIFEROL) 1.25 MG (44875 UT) CAPS capsule Take 1 capsule by mouth once a week 12 capsule 1    Misc. Devices (ADJUST BATH/SHOWER SEAT/BACK) MISC Use daily for taking shower 1 each 0    Misc. Devices (WALL GRAB BAR) MISC Use daily for taking shower 1 each 0    Misc. Devices MISC Please provide ramp for car 1 Device 0    Misc. Devices MISC Please provide shower head 1 Device 0    Misc. Devices MISC Please provide hand brake for car 1 Device 0    Handicap Placard MISC by Does not apply route Expires on 3/1/21 1 each 0     No current facility-administered medications for this visit.              Social History     Socioeconomic History    Marital status: Single     Spouse name: Not on file    Number of children: Not on file    Years of education: Not on file    Highest education level: Not on file   Occupational History    Occupation:      Comment: fulltime   Tobacco Use    Smoking status: Current Some Day Smoker     Packs/day: 0.00     Years: 8.00     Pack years: 0.00     Types: Cigarettes     Start date: 2002     Last attempt to quit: 10/20/2019     Years since quittin.0    Smokeless tobacco: Never Used   Vaping Use    Vaping Use: Never used   Substance and Sexual Activity    Alcohol use: No    Drug use: Yes     Types: Marijuana [place])     Comment: occ    Sexual activity: Not Currently   Other Topics Concern    Not on file   Social History Narrative    Not on file Social Determinants of Health     Financial Resource Strain:     Difficulty of Paying Living Expenses: Not on file   Food Insecurity:     Worried About Running Out of Food in the Last Year: Not on file    [place] of Food in the Last Year: Not on file   Transportation Needs:     Lack of Transportation (Medical): Not on file    Lack of Transportation (Non-Medical): Not on file   Physical Activity:     Days of Exercise per Week: Not on file    Minutes of Exercise per Session: Not on file   Stress:     Feeling of Stress : Not on file   Social Connections:     Frequency of Communication with Friends and Family: Not on file    Frequency of Social Gatherings with Friends and Family: Not on file    Attends Nondenominational Services: Not on file    Active Member of [address] [place] [address] [employer] or Organizations: Not on file    Attends Club or Organization Meetings: Not on file    Marital Status: Not on file   Intimate Partner Violence:     Fear of Current or Ex-Partner: Not on file    Emotionally Abused: Not on file    Physically Abused: Not on file    Sexually Abused: Not on file   Housing Stability:     Unable to Pay for Housing in the Last Year: Not on file    Number of [place] in the Last Year: Not on file    Unstable Housing in the Last Year: Not on file     Ready to quit: Not Answered  Counseling given: Not Answered        Family History   Problem Relation Age of Onset    Diabetes Father              -rest of complaints with corresponding details per ROS    The patient's past medical, surgical, social, and family history as well as his current medications and allergies were reviewed as documented intoday's encounter. Review of Systems   Constitutional: Positive for activity change and appetite change. Negative for chills, diaphoresis, fatigue and unexpected weight change. HENT: Negative for congestion, mouth sores, rhinorrhea and sinus pressure. Eyes: Negative for photophobia and visual disturbance. Respiratory: Negative for cough, chest tightness, shortness of breath and wheezing. Cardiovascular: Negative for chest pain and palpitations. Gastrointestinal: Negative for abdominal distention, blood in stool and constipation. Endocrine: Negative for polyuria. Genitourinary: Negative for difficulty urinating and frequency. Musculoskeletal: Positive for arthralgias and gait problem. Negative for joint swelling, myalgias and neck pain. Allergic/Immunologic: Negative for immunocompromised state. Neurological: Positive for weakness and numbness. Negative for dizziness and speech difficulty. Psychiatric/Behavioral: Negative for agitation, decreased concentration, dysphoric mood, hallucinations and sleep disturbance. The patient is nervous/anxious. Physical Exam  Vitals and nursing note reviewed. Constitutional:       Appearance: Normal appearance. HENT:      Mouth/Throat:      Mouth: Mucous membranes are moist.   Cardiovascular:      Rate and Rhythm: Normal rate and regular rhythm. Heart sounds: Normal heart sounds. Pulmonary:      Effort: Pulmonary effort is normal.      Breath sounds: Normal breath sounds. Abdominal:      General: Bowel sounds are normal.      Palpations: Abdomen is soft. Musculoskeletal:      Right upper arm: No deformity. Left upper arm: No deformity. Cervical back: Normal range of motion. Right foot: Deformity and foot drop present. Left foot: Deformity and foot drop present. Comments: Decreased strength bilateral   Neurological:      Mental Status: He is alert and oriented to person, place, and time. Cranial Nerves: Cranial nerves are intact. Sensory: Sensory deficit present. Motor: Weakness, atrophy and abnormal muscle tone present.       Gait: Gait abnormal.      Comments: Patient is wheelchair-bound not able to lift his both   Lower extremities   Psychiatric:         Attention and Perception: Attention and perception normal.         Mood and Affect: Mood is not anxious or depressed. Speech: He is communicative. ASSESSMENT AND PLAN      1. ALS (amyotrophic lateral sclerosis) ([facility] [address]  Follows with [facility]. Progressing disease in all extremities  - Comprehensive Metabolic Panel; Future  - CBC Auto Differential; Future    2. Chronic neuromuscular respiratory failure ([facility] [address]  Continue to follow with machine    3. Restrictive lung disease  PFT discussed    4. Weakness of both lower extremities    - Comprehensive Metabolic Panel; Future  - CBC Auto Differential; Future  - Vitamin B12 & Folate; Future  - Vitamin D 25 Hydroxy; Future    5. Vitamin D deficiency    - Vitamin D 25 Hydroxy; Future  - vitamin D (ERGOCALCIFEROL) 1.25 MG (18338 UT) CAPS capsule; Take 1 capsule by mouth once a week  Dispense: 12 capsule; Refill: 1      Orders Placed This Encounter   Procedures    Comprehensive Metabolic Panel     Fasting 8 hrs     Standing Status:   Future     Standing Expiration Date:   11/10/2022    CBC Auto Differential     Standing Status:   Future     Standing Expiration Date:   11/11/2022    Vitamin B12 & Folate     Standing Status:   Future     Standing Expiration Date:   11/10/2022    Vitamin D 25 Hydroxy     Standing Status:   Future     Standing Expiration Date:   11/10/2022         Medications Discontinued During This Encounter   Medication Reason    vitamin D (ERGOCALCIFEROL) 1.25 MG (25036 UT) CAPS capsule REORDER       Navin received counseling on the following healthy behaviors: nutrition, exercise and medication adherence  Reviewed prior labs and health maintenance  Continue current medications, diet and exercise. Discussed use, benefit, and side effects of prescribed medications. Barriers to medication compliance addressed. Patient given educational materials - see patient instructions  Was a self-tracking handout given in paper form or via [email or web address]?  Yes    Requested Prescriptions Signed Prescriptions Disp Refills    vitamin D (ERGOCALCIFEROL) 1.25 MG (70433 UT) CAPS capsule 12 capsule 1     Sig: Take 1 capsule by mouth once a week       All patient questions answered. Patient voiced understanding. Quality Measures    Body mass index is 20.81 kg/m². Normal. Weight control planned discussed Healthy diet and regular exercise. BP: 102/76 Blood pressure is normal. Treatment plan consists of No treatment change needed. No results found for: LDLCALC, LDLCHOLESTEROL, LDLDIRECT (goal LDL reduction with dx if diabetes is 50% LDL reduction)      PHQ Scores 4/1/2021 2/1/2021 8/17/2020 2/26/2019 12/27/2017   PHQ2 Score 0 0 0 0 0   PHQ9 Score 0 0 0 0 0     Interpretation of Total Score Depression Severity: 1-4 = Minimal depression, 5-9 = Mild depression, 10-14 = Moderate depression, 15-19 = Moderately severe depression, 20-27 = Severe depression    The patient'spast medical, surgical, social, and family history as well as his   current medications and allergies were reviewed as documented in today's encounter. Medications, labs, diagnostic studies, consultations andfollow-up as documented in this encounter. Return in about 1 year (around 11/10/2022). Patient wasseen with total face to face time of 30 minutes. More than 50% of this visit was counseling and education. Future Appointments   Date Time Provider Aman Gillespie   11/10/2022  2:30 PM Little Richardson MD [email or web address]     This note was completed by using the assistance of a speech-recognition program. However, inadvertent computerized transcription errors may be present. Althoughevery effort was made to ensure accuracy, no guarantees can be provided that every mistake has been identified and corrected by editing.   Electronically signed by Little Richardson MD on 11/10/2021  1:00 PM

## 2021-11-22 ENCOUNTER — TELEPHONE (OUTPATIENT)
Dept: FAMILY MEDICINE CLINIC | Age: 36
End: 2021-11-22

## 2021-11-22 NOTE — TELEPHONE ENCOUNTER
SPOKE WITH PATIENT HE IS CALLING THE ALS PROVIDER TO PLACE ORDERS FOR PT TO CONTINUE SINCE THEY HAVE HIM GOING FOR THIS THERAPY.

## 2022-07-07 ENCOUNTER — TELEPHONE (OUTPATIENT)
Dept: FAMILY MEDICINE CLINIC | Age: 37
End: 2022-07-07

## 2022-07-07 NOTE — TELEPHONE ENCOUNTER
Pt called in stating he has had diarrhea for the past 3 months, pt has tried taking immodium but it hasn't helped. Pt denies any abdominal pain, nausea, or vomiting. Writer adv pt of first available appt and pt declined stated he wasn't in a hurry to get in. Writer offered pt to do an E-visit and assisted pt with questionnaire. Selected PCP but it didn't not assign visit to PCP.

## 2022-07-08 NOTE — TELEPHONE ENCOUNTER
Pt can monitor symptoms and if he thinks symptoms are getting worse , he can schedule diana. He can keep himself hydrated .

## 2022-11-28 ENCOUNTER — TELEPHONE (OUTPATIENT)
Dept: FAMILY MEDICINE CLINIC | Age: 37
End: 2022-11-28

## 2022-11-28 NOTE — TELEPHONE ENCOUNTER
Incoming call came from home health care facilty [address] . Nurse Deisi Reyes is calling to see, if provider  will be able to sign off on home health care orders/PT order's for patient. Please give a call back at [phone]   Facility did give permission to leave a detail message with a verbal answer if provider will sign off. Please advise, Thank you!

## 2023-01-10 ENCOUNTER — TELEPHONE (OUTPATIENT)
Dept: FAMILY MEDICINE CLINIC | Age: 38
End: 2023-01-10

## 2023-01-10 DIAGNOSIS — M54.16 SPINAL STENOSIS OF LUMBAR REGION WITH RADICULOPATHY: ICD-10-CM

## 2023-01-10 DIAGNOSIS — R29.898 WEAKNESS OF BOTH LOWER EXTREMITIES: Primary | ICD-10-CM

## 2023-01-10 DIAGNOSIS — G12.21 ALS (AMYOTROPHIC LATERAL SCLEROSIS) (HCC): ICD-10-CM

## 2023-01-10 DIAGNOSIS — M48.061 SPINAL STENOSIS OF LUMBAR REGION WITH RADICULOPATHY: ICD-10-CM

## 2023-01-10 DIAGNOSIS — R29.6 FALLS FREQUENTLY: ICD-10-CM

## 2023-01-10 NOTE — TELEPHONE ENCOUNTER
Received a fax from  [facility]. She is requesting orders for   -Bathroom Modification for roll in shower  -ADA toilet with install  -Pedastal sink with install with related diagnosis on script     Fax all orders to [phone]     Also, please place a referral to PT or OT that they will go to patients home and evaluate for the need of a bathroom modification for roll in shower, ADA toilet with install and pedestal sink with install.  This is needed from therapist.

## 2023-02-22 ENCOUNTER — OFFICE VISIT (OUTPATIENT)
Dept: FAMILY MEDICINE CLINIC | Age: 38
End: 2023-02-22
Payer: MEDICARE

## 2023-02-22 VITALS
WEIGHT: 140 LBS | OXYGEN SATURATION: 98 % | TEMPERATURE: 98.8 F | BODY MASS INDEX: 20.04 KG/M2 | DIASTOLIC BLOOD PRESSURE: 70 MMHG | HEART RATE: 80 BPM | SYSTOLIC BLOOD PRESSURE: 110 MMHG | HEIGHT: 70 IN

## 2023-02-22 DIAGNOSIS — Z13.6 ENCOUNTER FOR LIPID SCREENING FOR CARDIOVASCULAR DISEASE: ICD-10-CM

## 2023-02-22 DIAGNOSIS — M04.9 AUTOINFLAMMATORY SYNDROME, UNSPECIFIED (HCC): ICD-10-CM

## 2023-02-22 DIAGNOSIS — E55.9 VITAMIN D DEFICIENCY: ICD-10-CM

## 2023-02-22 DIAGNOSIS — M54.16 SPINAL STENOSIS OF LUMBAR REGION WITH RADICULOPATHY: ICD-10-CM

## 2023-02-22 DIAGNOSIS — M48.061 SPINAL STENOSIS OF LUMBAR REGION WITH RADICULOPATHY: ICD-10-CM

## 2023-02-22 DIAGNOSIS — J96.10 CHRONIC NEUROMUSCULAR RESPIRATORY FAILURE (HCC): ICD-10-CM

## 2023-02-22 DIAGNOSIS — Z13.220 ENCOUNTER FOR LIPID SCREENING FOR CARDIOVASCULAR DISEASE: ICD-10-CM

## 2023-02-22 DIAGNOSIS — R29.6 FALLS FREQUENTLY: ICD-10-CM

## 2023-02-22 DIAGNOSIS — G12.21 ALS (AMYOTROPHIC LATERAL SCLEROSIS) (HCC): Primary | ICD-10-CM

## 2023-02-22 DIAGNOSIS — Z11.59 ENCOUNTER FOR SCREENING FOR OTHER VIRAL DISEASES: ICD-10-CM

## 2023-02-22 PROCEDURE — 99214 OFFICE O/P EST MOD 30 MIN: CPT | Performed by: FAMILY MEDICINE

## 2023-02-22 PROCEDURE — G8484 FLU IMMUNIZE NO ADMIN: HCPCS | Performed by: FAMILY MEDICINE

## 2023-02-22 PROCEDURE — 4004F PT TOBACCO SCREEN RCVD TLK: CPT | Performed by: FAMILY MEDICINE

## 2023-02-22 PROCEDURE — G8420 CALC BMI NORM PARAMETERS: HCPCS | Performed by: FAMILY MEDICINE

## 2023-02-22 PROCEDURE — G8427 DOCREV CUR MEDS BY ELIG CLIN: HCPCS | Performed by: FAMILY MEDICINE

## 2023-02-22 RX ORDER — WHEELCHAIR
EACH MISCELLANEOUS
Qty: 1 EACH | Refills: 0 | Status: SHIPPED | OUTPATIENT
Start: 2023-02-22

## 2023-02-22 SDOH — ECONOMIC STABILITY: INCOME INSECURITY: HOW HARD IS IT FOR YOU TO PAY FOR THE VERY BASICS LIKE FOOD, HOUSING, MEDICAL CARE, AND HEATING?: NOT HARD AT ALL

## 2023-02-22 SDOH — ECONOMIC STABILITY: HOUSING INSECURITY
IN THE LAST 12 MONTHS, WAS THERE A TIME WHEN YOU DID NOT HAVE A STEADY PLACE TO SLEEP OR SLEPT IN A SHELTER (INCLUDING NOW)?: NO

## 2023-02-22 SDOH — ECONOMIC STABILITY: FOOD INSECURITY: WITHIN THE PAST 12 MONTHS, THE FOOD YOU BOUGHT JUST DIDN'T LAST AND YOU DIDN'T HAVE MONEY TO GET MORE.: NEVER TRUE

## 2023-02-22 SDOH — ECONOMIC STABILITY: FOOD INSECURITY: WITHIN THE PAST 12 MONTHS, YOU WORRIED THAT YOUR FOOD WOULD RUN OUT BEFORE YOU GOT MONEY TO BUY MORE.: NEVER TRUE

## 2023-02-22 ASSESSMENT — ENCOUNTER SYMPTOMS
STRIDOR: 0
CHEST TIGHTNESS: 0
TROUBLE SWALLOWING: 0
NAUSEA: 0
RECTAL PAIN: 0
RHINORRHEA: 0
BLOOD IN STOOL: 0
SORE THROAT: 0
SINUS PRESSURE: 0
COUGH: 0
SHORTNESS OF BREATH: 1
BACK PAIN: 1
COLOR CHANGE: 0

## 2023-02-22 ASSESSMENT — PATIENT HEALTH QUESTIONNAIRE - PHQ9
SUM OF ALL RESPONSES TO PHQ9 QUESTIONS 1 & 2: 0
SUM OF ALL RESPONSES TO PHQ QUESTIONS 1-9: 0
2. FEELING DOWN, DEPRESSED OR HOPELESS: 0
SUM OF ALL RESPONSES TO PHQ QUESTIONS 1-9: 0
SUM OF ALL RESPONSES TO PHQ QUESTIONS 1-9: 0
1. LITTLE INTEREST OR PLEASURE IN DOING THINGS: 0
SUM OF ALL RESPONSES TO PHQ QUESTIONS 1-9: 0

## 2023-02-22 NOTE — PROGRESS NOTES
Chief Complaint   Patient presents with    Follow-up Chronic Condition     ALS         Claude Poritllo III  here today for follow up on chronic medical problems, go over labs and/or diagnostic studies, and medication refills. Follow-up Chronic Condition (ALS)      HPI: Patient is scheduled for follow-up on chronic medical conditions. Patient has history of ALS follows with [address] [place] [address],[address] every 6 months. Patient was recently seen in month of February. Patient reports he is planning to try a new drug for ALS, he did not try any treatment so far. Patient is working with insurance to get that approved. The name of the drug is sodium phenylbutyrate/taurursodiol ([id number]. Patient has nursing aide who comes and helps him with his ADLs. Currently he is using power motorized scooter. He has no strength in lower extremities. Patient has slight strength in upper extremities. He does have mild shortness of breath and dysphagia. Patient reports he needs wheelchair which can help to slide easily in the car. Has difficulty in going problems his scooter to car. Claude Portillo III can self propel a wheelchair and needs anti-rollback device because of ramps. /70   Pulse 80   Temp 98.8 °F (37.1 °C)   Ht 5' 10\" (1.778 m)   Wt 140 lb (63.5 kg)   SpO2 98%   BMI 20.09 kg/m²    Body mass index is 20.09 kg/m². Wt Readings from Last 3 Encounters:   02/22/23 140 lb (63.5 kg)   11/10/21 145 lb (65.8 kg)   04/01/21 151 lb 6.4 oz (68.7 kg)        [x]Negative depression screening. PHQ Scores 2/22/2023 4/1/2021 2/1/2021 8/17/2020 2/26/2019 12/27/2017   PHQ2 Score 0 0 0 0 0 0   PHQ9 Score 0 0 0 0 0 0      []1-4 = Minimal depression   []5-9 = Milddepression   []10-14 = Moderate depression   []15-19 = Moderately severe depression   []20-27 = Severe depression    Discussed testing with the patient and all questions fully answered.     Hospital Outpatient Visit on 08/17/2020   Component Date Value Ref Range Status    WBC 08/17/2020 6.6  3.5 - 11.0 k/uL Final    RBC 08/17/2020 4.93  4.5 - 5.9 m/uL Final    Hemoglobin 08/17/2020 15.7  13.5 - 17.5 g/dL Final    Hematocrit 08/17/2020 45.1  41 - 53 % Final    MCV 08/17/2020 91.6  80 - 100 fL Final    MCH 08/17/2020 31.9  26 - 34 pg Final    MCHC 08/17/2020 34.9  31 - 37 g/dL Final    RDW 08/17/2020 13.3  11.5 - 14.9 % Final    Platelets 54/41/1431 261  150 - 450 k/uL Final    MPV 08/17/2020 7.0  6.0 - 12.0 fL Final    NRBC Automated 08/17/2020 NOT REPORTED  per 100 WBC Final    Differential Type 08/17/2020 NOT REPORTED   Final    Seg Neutrophils 08/17/2020 70 (A)  36 - 66 % Final    Lymphocytes 08/17/2020 22 (A)  24 - 44 % Final    Monocytes 08/17/2020 6  1 - 7 % Final    Eosinophils % 08/17/2020 1  0 - 4 % Final    Basophils 08/17/2020 1  0 - 2 % Final    Immature Granulocytes 08/17/2020 NOT REPORTED  0 % Final    Segs Absolute 08/17/2020 4.60  1.3 - 9.1 k/uL Final    Absolute Lymph # 08/17/2020 1.40  1.0 - 4.8 k/uL Final    Absolute Mono # 08/17/2020 0.40  0.1 - 1.3 k/uL Final    Absolute Eos # 08/17/2020 0.10  0.0 - 0.4 k/uL Final    Basophils Absolute 08/17/2020 0.00  0.0 - 0.2 k/uL Final    Absolute Immature Granulocyte 08/17/2020 NOT REPORTED  0.00 - 0.30 k/uL Final    WBC Morphology 08/17/2020 NOT REPORTED   Final    RBC Morphology 08/17/2020 NOT REPORTED   Final    Platelet Estimate 06/83/5836 NOT REPORTED   Final    Glucose 08/17/2020 96  70 - 99 mg/dL Final    BUN 08/17/2020 16  6 - 20 mg/dL Final    Creatinine 08/17/2020 <0.40 (A)  0.70 - 1.20 mg/dL Final    Bun/Cre Ratio 08/17/2020 NOT REPORTED  9 - 20 Final    Calcium 08/17/2020 8.6  8.6 - 10.4 mg/dL Final    Sodium 08/17/2020 137  135 - 144 mmol/L Final    Potassium 08/17/2020 3.8  3.7 - 5.3 mmol/L Final    Chloride 08/17/2020 104  98 - 107 mmol/L Final    CO2 08/17/2020 23  20 - 31 mmol/L Final    Anion Gap 08/17/2020 10  9 - 17 mmol/L Final    Alkaline Phosphatase 08/17/2020 60  40 - 129 U/L Final ALT 08/17/2020 34  5 - 41 U/L Final    AST 08/17/2020 32  <40 U/L Final    Total Bilirubin 08/17/2020 0.59  0.3 - 1.2 mg/dL Final    Total Protein 08/17/2020 6.4  6.4 - 8.3 g/dL Final    Albumin 08/17/2020 4.2  3.5 - 5.2 g/dL Final    Albumin/Globulin Ratio 08/17/2020 NOT REPORTED  1.0 - 2.5 Final    GFR Non- 08/17/2020 CANNOT BE CALCULATED  >60 mL/min Final    GFR  08/17/2020 CANNOT BE CALCULATED  >60 mL/min Final    GFR Comment 08/17/2020        Final    Comment: Average GFR for 30-36 years old:   107 mL/min/1.73sq m  Chronic Kidney Disease:   <60 mL/min/1.73sq m  Kidney failure:   <15 mL/min/1.73sq m              eGFR calculated using average adult body mass.  Additional eGFR calculator available at:        [email or web address]            GFR Staging 08/17/2020 NOT REPORTED   Final    TSH 08/17/2020 2.16  0.30 - 5.00 mIU/L Final    Vitamin B-12 08/17/2020 548  232 - 1245 pg/mL Final    Folate 08/17/2020 6.5  >4.8 ng/mL Final    Vit D, 25-Hydroxy 08/17/2020 14.2 (A)  30.0 - 100.0 ng/mL Final    Comment:    Reference Range:  Vitamin D status         Range   Deficiency              <20 ng/mL   Mild Deficiency       20-30 ng/mL   Sufficiency           ng/mL   Toxicity               >100 ng/mL           Most recent labs reviewed:     Lab Results   Component Value Date    WBC 6.6 08/17/2020    HGB 15.7 08/17/2020    HCT 45.1 08/17/2020    MCV 91.6 08/17/2020     08/17/2020       @BRIEFLAB(NA,K,CL,CO2,BUN,CREATININE,GLUCOSE,CALCIUM)@     Lab Results   Component Value Date    ALT 34 08/17/2020    AST 32 08/17/2020    ALKPHOS 60 08/17/2020    BILITOT 0.59 08/17/2020       Lab Results   Component Value Date    TSH 2.16 08/17/2020       No results found for: CHOL  No results found for: TRIG  No results found for: HDL  No results found for: LDLCALC, LDLCHOLESTEROL  No results found for: LABVLDL, VLDL  No results found for: [facility]    Lab Results Component Value Date    LABA1C 5.0 02/26/2019       Lab Results   Component Value Date    HRBXXKJW96 788 08/17/2020       Lab Results   Component Value Date    FOLATE 6.5 08/17/2020       No results found for: IRON, TIBC, FERRITIN    Lab Results   Component Value Date    VITD25 14.2 (L) 08/17/2020             Current Outpatient Medications   Medication Sig Dispense Refill    Misc. Devices [facility]) MISC Please provide portable wheelchair with arms which can come off to slide. 1 each 0    Misc. Devices (ADJUST BATH/SHOWER SEAT/BACK) MISC Use daily for taking shower 1 each 0    Misc. Devices (WALL GRAB BAR) MISC Use daily for taking shower 1 each 0    Misc. Devices MISC Please provide ramp for car 1 Device 0    Misc. Devices MISC Please provide shower head 1 Device 0    Misc. Devices MISC Please provide hand brake for car 1 Device 0    Handicap Placard MISC by Does not apply route Expires on 3/1/21 1 each 0     No current facility-administered medications for this visit.              Social History     Socioeconomic History    Marital status: Single     Spouse name: Not on file    Number of children: Not on file    Years of education: Not on file    Highest education level: Not on file   Occupational History    Occupation:      Comment: fulltime   Tobacco Use    Smoking status: Some Days     Packs/day: 0.00     Years: 8.00     Pack years: 0.00     Types: Cigarettes     Start date: 6/14/2002     Last attempt to quit: 10/20/2019     Years since quitting: 3.3    Smokeless tobacco: Never   Vaping Use    Vaping Use: Never used   Substance and Sexual Activity    Alcohol use: No    Drug use: Yes     Types: Marijuana Deadra Georgi)     Comment: occ    Sexual activity: Not Currently   Other Topics Concern    Not on file   Social History Narrative    Not on file     Social Determinants of Health     Financial Resource Strain: Low Risk     Difficulty of Paying Living Expenses: Not hard at all   Food Insecurity: No Food Insecurity    Worried About Running Out of Food in the Last Year: Never true    [address] in the Last Year: Never true   Transportation Needs: Unknown    Lack of Transportation (Medical): Not on file    Lack of Transportation (Non-Medical): No   Physical Activity: Not on file   Stress: Not on file   Social Connections: Not on file   Intimate Partner Violence: Not on file   Housing Stability: Unknown    Unable to Pay for Housing in the Last Year: Not on file    Number of Places Lived in the Last Year: Not on file    Unstable Housing in the Last Year: No     Ready to quit: Not Answered  Counseling given: Yes        Family History   Problem Relation Age of Onset    Diabetes Father              -rest of complaints with corresponding details per ROS    The patient's past medical, surgical, social, and family history as well as his current medications and allergies were reviewed as documented intoday's encounter. Review of Systems   Constitutional:  Positive for activity change and fatigue. Negative for appetite change and fever. HENT:  Negative for ear pain, postnasal drip, rhinorrhea, sinus pressure, sore throat and trouble swallowing. Respiratory:  Positive for shortness of breath. Negative for cough, chest tightness and stridor. Cardiovascular:  Negative for chest pain. Gastrointestinal:  Negative for blood in stool, nausea and rectal pain. Endocrine: Negative for polyuria. Genitourinary:  Negative for difficulty urinating, flank pain, frequency and urgency. Musculoskeletal:  Positive for arthralgias, back pain, gait problem and joint swelling. Negative for myalgias and neck pain. Skin:  Negative for color change, rash and wound. Allergic/Immunologic: Positive for immunocompromised state. Neurological:  Positive for weakness and numbness. Negative for dizziness, speech difficulty, light-headedness and headaches.    Psychiatric/Behavioral:  Negative for agitation, behavioral problems, decreased concentration, dysphoric mood, hallucinations, sleep disturbance and suicidal ideas. The patient is nervous/anxious. Physical Exam  Vitals and nursing note reviewed. Constitutional:       General: He is not in acute distress. Appearance: Normal appearance. He is well-developed. He is not diaphoretic. HENT:      Head: Normocephalic. Eyes:      General:         Right eye: No discharge. Left eye: No discharge. Conjunctiva/sclera: Conjunctivae normal.      Pupils: Pupils are equal, round, and reactive to light. Neck:      Thyroid: No thyromegaly. Cardiovascular:      Rate and Rhythm: Normal rate and regular rhythm. Heart sounds: Normal heart sounds. No murmur heard. Pulmonary:      Effort: Pulmonary effort is normal. No respiratory distress. Breath sounds: Decreased breath sounds present. No wheezing or rhonchi. Abdominal:      General: Bowel sounds are normal. There is no distension. Palpations: Abdomen is soft. There is no mass. Tenderness: There is no abdominal tenderness. There is no right CVA tenderness, left CVA tenderness, guarding or rebound. Musculoskeletal:         General: No tenderness. Cervical back: Normal range of motion and neck supple. Lumbar back: Normal range of motion. Lymphadenopathy:      Cervical: No cervical adenopathy. Skin:     Coloration: Skin is not jaundiced or pale. Findings: No bruising, erythema or rash. Neurological:      General: No focal deficit present. Mental Status: He is alert and oriented to person, place, and time. Cranial Nerves: No cranial nerve deficit. Sensory: Sensory deficit present. Motor: Weakness present. No tremor. Coordination: Coordination normal.      Gait: Gait abnormal. Tandem walk normal.      Comments: Complete loss of strength in both lower extremities.    Psychiatric:         Attention and Perception: Attention and perception normal. He is attentive. Mood and Affect: Mood is anxious. Mood is not depressed. Affect is not tearful. Speech: He is communicative. Speech is not rapid and pressured, delayed or slurred. Behavior: Behavior normal. Behavior is not agitated or slowed. Thought Content: Thought content normal.         Judgment: Judgment normal.           ASSESSMENT AND PLAN      1. ALS (amyotrophic lateral sclerosis) (HCC)  Chronic problem recently worsening, follow-up with an extremity done labs ordered. - CBC; Future  - Comprehensive Metabolic Panel; Future  - Magnesium; Future  - Vitamin D 25 Hydroxy; Future  - Vitamin B12 & Folate; Future  - Uric Acid; Future  - Urinalysis with Reflex to Culture; Future  - Uric Acid; Future    2. Chronic neuromuscular respiratory failure (HCC)  Chronic problem recent worsening, planning to start new FDA approved medication for ALS  - CBC; Future  - Comprehensive Metabolic Panel; Future    3. Spinal stenosis of lumbar region with radiculopathy  Chronic problem at his baseline takes Tylenol as needed    4. Vitamin D deficiency  Recheck vitamin D levels not taking any supplements  - Vitamin D 25 Hydroxy; Future    5. Falls frequently  Patient has frequent falls, is currently using motorized scooter   - TSH with Reflex; Future  - Misc. Devices [facility]) MISC; Please provide portable wheelchair with arms which can come off to slide. Dispense: 1 each; Refill: 0    6. Autoinflammatory syndrome, unspecified ([facility] [address]     - TSH with Reflex; Future    7. Encounter for screening for other viral diseases    - Hepatitis C Antibody; Future  - HIV Screen; Future    8. Encounter for lipid screening for cardiovascular disease    - Lipid Panel;  Future    Orders Placed This Encounter   Procedures    CBC     Standing Status:   Future     Standing Expiration Date:   2/22/2024    Comprehensive Metabolic Panel     Fasting 8 hrs     Standing Status:   Future     Standing Expiration Date:   2/22/2024 Hepatitis C Antibody     Standing Status:   Future     Standing Expiration Date:   2/22/2024    Lipid Panel     Standing Status:   Future     Standing Expiration Date:   2/22/2024     Order Specific Question:   Is Patient Fasting?/# of Hours     Answer:   yes, 8-10 hours    Magnesium     Standing Status:   Future     Standing Expiration Date:   2/22/2024    Vitamin D 25 Hydroxy     Standing Status:   Future     Standing Expiration Date:   2/22/2024    Vitamin B12 & Folate     Standing Status:   Future     Standing Expiration Date:   2/22/2024    Uric Acid     Standing Status:   Future     Standing Expiration Date:   2/22/2024    TSH with Reflex     Standing Status:   Future     Standing Expiration Date:   2/22/2024    Urinalysis with Reflex to Culture     Standing Status:   Future     Standing Expiration Date:   2/22/2024     Order Specific Question:   SPECIFY(EX-CATH,MIDSTREAM,CYSTO,ETC)? Answer:   midstream    Uric Acid     Standing Status:   Future     Standing Expiration Date:   2/22/2024    HIV Screen     Standing Status:   Future     Standing Expiration Date:   2/22/2024         Medications Discontinued During This Encounter   Medication Reason    vitamin D (ERGOCALCIFEROL) 1.25 MG (44518 UT) CAPS capsule Therapy completed       Tacho Robles received counseling on the following healthy behaviors: nutrition, exercise, and medication adherence  Reviewed prior labs and health maintenance  Continue current medications, diet and exercise. Discussed use, benefit, and side effects of prescribed medications. Barriers to medication compliance addressed. Patient given educational materials - see patient instructions  Was a self-tracking handout given in paper form or via [employer]? Yes    Requested Prescriptions     Signed Prescriptions Disp Refills    Misc. Devices [facility]) MISC 1 each 0     Sig: Please provide portable wheelchair with arms which can come off to slide. All patient questions answered.   Patient voiced understanding. Quality Measures    Body mass index is 20.09 kg/m². Normal. Weight control planned discussed Healthy diet and regular exercise. BP: 110/70 Blood pressure is Normal. Treatment plan consists of No treatment change needed. No results found for: LDLCALC, LDLCHOLESTEROL, LDLDIRECT (goal LDL reduction with dx if diabetes is 50% LDL reduction)      PHQ Scores 2/22/2023 4/1/2021 2/1/2021 8/17/2020 2/26/2019 12/27/2017   PHQ2 Score 0 0 0 0 0 0   PHQ9 Score 0 0 0 0 0 0     Interpretation of Total Score Depression Severity: 1-4 = Minimal depression, 5-9 = Mild depression, 10-14 = Moderate depression, 15-19 = Moderately severe depression, 20-27 = Severe depression    The patient'spast medical, surgical, social, and family history as well as his   current medications and allergies were reviewed as documented in today's encounter. Medications, labs, diagnostic studies, consultations andfollow-up as documented in this encounter. Return in about 6 months (around 8/22/2023) for 30min,always chronic conditons. Patient wasseen with total face to face time of 35 minutes. More than 50% of this visit was counseling and education. Future Appointments   Date Time Provider Aman Gillespie   9/25/2023  9:00 AM [facility]     This note was completed by using the assistance of a speech-recognition program. However, inadvertent computerized transcription errors may be present. Althoughevery effort was made to ensure accuracy, no guarantees can be provided that every mistake has been identified and corrected by editing.   Electronically signed by Sakshi Bender MD on 2/22/2023  1:37 PM

## 2023-02-22 NOTE — PROGRESS NOTES
Visit Information    Have you changed or started any medications since your last visit including any over-the-counter medicines, vitamins, or herbal medicines? no   Are you having any side effects from any of your medications? -  no  Have you stopped taking any of your medications? Is so, why? -  no    Have you seen any other physician or provider since your last visit? No  Have you had any other diagnostic tests since your last visit? No  Have you been seen in the emergency room and/or had an admission to a hospital since we last saw you? No  Have you had your routine dental cleaning in the past 6 months? no    Have you activated your [employer] account? If not, what are your barriers? Yes     Patient Care Team:  Judith Mann MD as PCP - General (Family Medicine)  Judith Mann MD as PCP - Empaneled Provider    Medical History Review  Past Medical, Family, and Social History reviewed and does contribute to the patient presenting condition    Health Maintenance   Topic Date Due    Varicella vaccine (1 of 2 - 2-dose childhood series) Never done    HIV screen  Never done    Hepatitis C screen  Never done    COVID-19 Vaccine (3 - Booster for Pfizer series) 07/16/2021    Depression Screen  04/01/2022    Flu vaccine (1) Never done    DTaP/Tdap/Td vaccine (2 - Td or Tdap) 10/11/2027    Pneumococcal 0-64 years Vaccine  Completed    Hepatitis A vaccine  Aged Out    Hib vaccine  Aged Out    Meningococcal (ACWY) vaccine  Aged Out

## 2023-02-22 NOTE — PATIENT INSTRUCTIONS
New Updates for [id number]/ [employer] ([facility]) DIANA    Thank you for choosing US to give you the best care! [employer] ([facility]) is always trying to think of new ways to help their patients. We are asking all patients to try out the new digital registration that is now available through your [facility] account or the new DIANA, [employer] ([facility]). Via the diana you're now able to update your personal and registration information prior to your upcoming appointment. This will save you time once you arrive at the office to check-in, not to mention your information remains safe!! Many other perks come from signing up for an account, such as:  Requesting refills  Scheduling an appointment  Completing an E-Visit  Sending a message to the office/provider  Having access to your medication list  Paying your bill/copay prior to your appointment  Scheduling your yearly mammogram  Review your test results    If you are not familiar with [facility] or the [employer] ([facility]) DIANA, please ask one of us and we will be happy to answer any questions or help you set-up your account.       Your [id number] [address] for [employer] office,  Inderjit

## 2023-03-30 ENCOUNTER — TELEPHONE (OUTPATIENT)
Dept: FAMILY MEDICINE CLINIC | Age: 38
End: 2023-03-30

## 2023-03-30 NOTE — TELEPHONE ENCOUNTER
Incoming call came from home health care facilty [facility] Physical Therapist Sharee is calling to see, if provider Dr. Juice Ambrocio will be able to sign off on PT order's for patient to continue PT once a week for the next 4 weeks. Please give a call back at [phone]. Please advise, Thank you!

## 2023-05-03 DIAGNOSIS — G12.21 ALS (AMYOTROPHIC LATERAL SCLEROSIS) (HCC): Primary | ICD-10-CM

## 2023-05-03 DIAGNOSIS — J96.10 CHRONIC NEUROMUSCULAR RESPIRATORY FAILURE (HCC): ICD-10-CM

## 2023-05-03 DIAGNOSIS — M04.9 AUTOINFLAMMATORY SYNDROME, UNSPECIFIED (HCC): ICD-10-CM

## 2023-10-12 ENCOUNTER — OFFICE VISIT (OUTPATIENT)
Dept: FAMILY MEDICINE CLINIC | Age: 38
End: 2023-10-12
Payer: COMMERCIAL

## 2023-10-12 VITALS
HEIGHT: 70 IN | SYSTOLIC BLOOD PRESSURE: 114 MMHG | HEART RATE: 87 BPM | BODY MASS INDEX: 20.09 KG/M2 | TEMPERATURE: 98.1 F | DIASTOLIC BLOOD PRESSURE: 70 MMHG

## 2023-10-12 DIAGNOSIS — Z71.89 ACP (ADVANCE CARE PLANNING): ICD-10-CM

## 2023-10-12 DIAGNOSIS — Z00.00 INITIAL MEDICARE ANNUAL WELLNESS VISIT: Primary | ICD-10-CM

## 2023-10-12 DIAGNOSIS — R29.898 WEAKNESS OF BOTH LOWER EXTREMITIES: ICD-10-CM

## 2023-10-12 DIAGNOSIS — G12.21 ALS (AMYOTROPHIC LATERAL SCLEROSIS) (HCC): ICD-10-CM

## 2023-10-12 PROCEDURE — G8484 FLU IMMUNIZE NO ADMIN: HCPCS | Performed by: FAMILY MEDICINE

## 2023-10-12 PROCEDURE — G0438 PPPS, INITIAL VISIT: HCPCS | Performed by: FAMILY MEDICINE

## 2023-10-12 PROCEDURE — 99497 ADVNCD CARE PLAN 30 MIN: CPT | Performed by: FAMILY MEDICINE

## 2023-10-12 RX ORDER — LOPERAMIDE HYDROCHLORIDE 2 MG/1
2 CAPSULE ORAL 4 TIMES DAILY PRN
COMMUNITY

## 2023-10-12 ASSESSMENT — LIFESTYLE VARIABLES
HOW MANY STANDARD DRINKS CONTAINING ALCOHOL DO YOU HAVE ON A TYPICAL DAY: PATIENT DOES NOT DRINK
HOW OFTEN DO YOU HAVE A DRINK CONTAINING ALCOHOL: NEVER

## 2023-10-12 ASSESSMENT — PATIENT HEALTH QUESTIONNAIRE - PHQ9
SUM OF ALL RESPONSES TO PHQ QUESTIONS 1-9: 0
SUM OF ALL RESPONSES TO PHQ QUESTIONS 1-9: 0
2. FEELING DOWN, DEPRESSED OR HOPELESS: 0
SUM OF ALL RESPONSES TO PHQ QUESTIONS 1-9: 0
SUM OF ALL RESPONSES TO PHQ9 QUESTIONS 1 & 2: 0
SUM OF ALL RESPONSES TO PHQ QUESTIONS 1-9: 0
1. LITTLE INTEREST OR PLEASURE IN DOING THINGS: 0

## 2024-01-01 ENCOUNTER — HOSPITAL ENCOUNTER (EMERGENCY)
Age: 39
End: 2024-05-19
Attending: EMERGENCY MEDICINE
Payer: COMMERCIAL

## 2024-01-01 ENCOUNTER — APPOINTMENT (OUTPATIENT)
Dept: GENERAL RADIOLOGY | Age: 39
End: 2024-01-01
Payer: COMMERCIAL

## 2024-01-01 ENCOUNTER — ANESTHESIA (OUTPATIENT)
Dept: ENDOSCOPY | Age: 39
End: 2024-01-01
Payer: COMMERCIAL

## 2024-01-01 ENCOUNTER — HOSPITAL ENCOUNTER (OUTPATIENT)
Age: 39
Setting detail: OUTPATIENT SURGERY
Discharge: HOME | End: 2024-05-17
Attending: SURGERY | Admitting: SURGERY
Payer: COMMERCIAL

## 2024-01-01 ENCOUNTER — ANESTHESIA EVENT (OUTPATIENT)
Dept: ENDOSCOPY | Age: 39
End: 2024-01-01
Payer: COMMERCIAL

## 2024-01-01 VITALS
RESPIRATION RATE: 33 BRPM | TEMPERATURE: 98.8 F | HEART RATE: 30 BPM | DIASTOLIC BLOOD PRESSURE: 28 MMHG | OXYGEN SATURATION: 53 % | SYSTOLIC BLOOD PRESSURE: 38 MMHG

## 2024-01-01 VITALS
TEMPERATURE: 98.7 F | BODY MASS INDEX: 19.26 KG/M2 | DIASTOLIC BLOOD PRESSURE: 83 MMHG | OXYGEN SATURATION: 96 % | HEART RATE: 123 BPM | RESPIRATION RATE: 42 BRPM | WEIGHT: 130 LBS | SYSTOLIC BLOOD PRESSURE: 111 MMHG | HEIGHT: 69 IN

## 2024-01-01 DIAGNOSIS — G12.21 ALS (AMYOTROPHIC LATERAL SCLEROSIS) (HCC): ICD-10-CM

## 2024-01-01 DIAGNOSIS — J96.02 ACUTE RESPIRATORY FAILURE WITH HYPOXIA AND HYPERCAPNIA (HCC): ICD-10-CM

## 2024-01-01 DIAGNOSIS — J18.9 PNEUMONIA OF RIGHT LOWER LOBE DUE TO INFECTIOUS ORGANISM: Primary | ICD-10-CM

## 2024-01-01 DIAGNOSIS — E46 MALNUTRITION, UNSPECIFIED TYPE (HCC): ICD-10-CM

## 2024-01-01 DIAGNOSIS — J96.01 ACUTE RESPIRATORY FAILURE WITH HYPOXIA AND HYPERCAPNIA (HCC): ICD-10-CM

## 2024-01-01 LAB
ANION GAP SERPL CALCULATED.3IONS-SCNC: 19 MMOL/L (ref 9–16)
BUN SERPL-MCNC: 16 MG/DL (ref 6–20)
CALCIUM SERPL-MCNC: 10 MG/DL (ref 8.6–10.4)
CHLORIDE SERPL-SCNC: 122 MMOL/L (ref 98–107)
CO2 SERPL-SCNC: 15 MMOL/L (ref 20–31)
CREAT SERPL-MCNC: 0.5 MG/DL (ref 0.7–1.2)
ERYTHROCYTE [DISTWIDTH] IN BLOOD BY AUTOMATED COUNT: 13.3 % (ref 11.8–14.4)
GFR, ESTIMATED: >90 ML/MIN/1.73M2
GLUCOSE SERPL-MCNC: 169 MG/DL (ref 74–99)
HCT VFR BLD AUTO: 55.7 % (ref 40.7–50.3)
HGB BLD-MCNC: 16.4 G/DL (ref 13–17)
LACTIC ACID, WHOLE BLOOD: 1.3 MMOL/L (ref 0.7–2.1)
MCH RBC QN AUTO: 30.6 PG (ref 25.2–33.5)
MCHC RBC AUTO-ENTMCNC: 29.4 G/DL (ref 28.4–34.8)
MCV RBC AUTO: 103.9 FL (ref 82.6–102.9)
NRBC BLD-RTO: 0 PER 100 WBC
PLATELET # BLD AUTO: 524 K/UL (ref 138–453)
PMV BLD AUTO: 8.6 FL (ref 8.1–13.5)
POTASSIUM SERPL-SCNC: 4.1 MMOL/L (ref 3.7–5.3)
RBC # BLD AUTO: 5.36 M/UL (ref 4.21–5.77)
SODIUM SERPL-SCNC: 156 MMOL/L (ref 136–145)
WBC OTHER # BLD: 24.1 K/UL (ref 3.5–11.3)

## 2024-01-01 PROCEDURE — 95813 EEG EXTND MNTR 61-119 MIN: CPT | Performed by: PSYCHIATRY & NEUROLOGY

## 2024-01-01 PROCEDURE — 88305 TISSUE EXAM BY PATHOLOGIST: CPT

## 2024-01-01 PROCEDURE — 96361 HYDRATE IV INFUSION ADD-ON: CPT

## 2024-01-01 PROCEDURE — 6360000002 HC RX W HCPCS: Performed by: NURSE PRACTITIONER

## 2024-01-01 PROCEDURE — 7100000001 HC PACU RECOVERY - ADDTL 15 MIN: Performed by: SURGERY

## 2024-01-01 PROCEDURE — 7100000000 HC PACU RECOVERY - FIRST 15 MIN: Performed by: SURGERY

## 2024-01-01 PROCEDURE — 3609013300 HC EGD TUBE PLACEMENT: Performed by: SURGERY

## 2024-01-01 PROCEDURE — 83605 ASSAY OF LACTIC ACID: CPT

## 2024-01-01 PROCEDURE — 85027 COMPLETE CBC AUTOMATED: CPT

## 2024-01-01 PROCEDURE — 3700000001 HC ADD 15 MINUTES (ANESTHESIA): Performed by: SURGERY

## 2024-01-01 PROCEDURE — 96374 THER/PROPH/DIAG INJ IV PUSH: CPT

## 2024-01-01 PROCEDURE — 95705 EEG W/O VID 2-12 HR UNMNTR: CPT

## 2024-01-01 PROCEDURE — 2709999900 HC NON-CHARGEABLE SUPPLY: Performed by: SURGERY

## 2024-01-01 PROCEDURE — 6360000002 HC RX W HCPCS

## 2024-01-01 PROCEDURE — 2500000003 HC RX 250 WO HCPCS

## 2024-01-01 PROCEDURE — 2580000003 HC RX 258: Performed by: ANESTHESIOLOGY

## 2024-01-01 PROCEDURE — 80048 BASIC METABOLIC PNL TOTAL CA: CPT

## 2024-01-01 PROCEDURE — 96375 TX/PRO/DX INJ NEW DRUG ADDON: CPT

## 2024-01-01 PROCEDURE — 6360000002 HC RX W HCPCS: Performed by: STUDENT IN AN ORGANIZED HEALTH CARE EDUCATION/TRAINING PROGRAM

## 2024-01-01 PROCEDURE — 6370000000 HC RX 637 (ALT 250 FOR IP): Performed by: STUDENT IN AN ORGANIZED HEALTH CARE EDUCATION/TRAINING PROGRAM

## 2024-01-01 PROCEDURE — 99284 EMERGENCY DEPT VISIT MOD MDM: CPT

## 2024-01-01 PROCEDURE — 2720000010 HC SURG SUPPLY STERILE: Performed by: SURGERY

## 2024-01-01 PROCEDURE — P9047 ALBUMIN (HUMAN), 25%, 50ML: HCPCS

## 2024-01-01 PROCEDURE — 3700000000 HC ANESTHESIA ATTENDED CARE: Performed by: SURGERY

## 2024-01-01 PROCEDURE — 71045 X-RAY EXAM CHEST 1 VIEW: CPT

## 2024-01-01 PROCEDURE — 2580000003 HC RX 258: Performed by: STUDENT IN AN ORGANIZED HEALTH CARE EDUCATION/TRAINING PROGRAM

## 2024-01-01 PROCEDURE — 94660 CPAP INITIATION&MGMT: CPT

## 2024-01-01 RX ORDER — SODIUM CHLORIDE 0.9 % (FLUSH) 0.9 %
5-40 SYRINGE (ML) INJECTION EVERY 12 HOURS SCHEDULED
Status: DISCONTINUED | OUTPATIENT
Start: 2024-01-01 | End: 2024-01-01 | Stop reason: HOSPADM

## 2024-01-01 RX ORDER — METOPROLOL TARTRATE 1 MG/ML
INJECTION, SOLUTION INTRAVENOUS
Status: COMPLETED
Start: 2024-01-01 | End: 2024-01-01

## 2024-01-01 RX ORDER — MORPHINE SULFATE 4 MG/ML
4 INJECTION, SOLUTION INTRAMUSCULAR; INTRAVENOUS
Status: DISCONTINUED | OUTPATIENT
Start: 2024-01-01 | End: 2024-01-01 | Stop reason: HOSPADM

## 2024-01-01 RX ORDER — LIDOCAINE HYDROCHLORIDE 10 MG/ML
1 INJECTION, SOLUTION EPIDURAL; INFILTRATION; INTRACAUDAL; PERINEURAL
Status: DISCONTINUED | OUTPATIENT
Start: 2024-01-01 | End: 2024-01-01 | Stop reason: HOSPADM

## 2024-01-01 RX ORDER — PROPOFOL 10 MG/ML
INJECTION, EMULSION INTRAVENOUS CONTINUOUS PRN
Status: DISCONTINUED | OUTPATIENT
Start: 2024-01-01 | End: 2024-01-01 | Stop reason: SDUPTHER

## 2024-01-01 RX ORDER — LIDOCAINE HYDROCHLORIDE 10 MG/ML
INJECTION, SOLUTION EPIDURAL; INFILTRATION; INTRACAUDAL; PERINEURAL PRN
Status: DISCONTINUED | OUTPATIENT
Start: 2024-01-01 | End: 2024-01-01 | Stop reason: SDUPTHER

## 2024-01-01 RX ORDER — POLYVINYL ALCOHOL 14 MG/ML
1 SOLUTION/ DROPS OPHTHALMIC PRN
Status: DISCONTINUED | OUTPATIENT
Start: 2024-01-01 | End: 2024-01-01 | Stop reason: HOSPADM

## 2024-01-01 RX ORDER — ALBUMIN (HUMAN) 12.5 G/50ML
SOLUTION INTRAVENOUS
Status: COMPLETED
Start: 2024-01-01 | End: 2024-01-01

## 2024-01-01 RX ORDER — SODIUM CHLORIDE 9 MG/ML
INJECTION, SOLUTION INTRAVENOUS PRN
Status: DISCONTINUED | OUTPATIENT
Start: 2024-01-01 | End: 2024-01-01 | Stop reason: HOSPADM

## 2024-01-01 RX ORDER — SODIUM CHLORIDE, SODIUM LACTATE, POTASSIUM CHLORIDE, CALCIUM CHLORIDE 600; 310; 30; 20 MG/100ML; MG/100ML; MG/100ML; MG/100ML
INJECTION, SOLUTION INTRAVENOUS CONTINUOUS
Status: DISCONTINUED | OUTPATIENT
Start: 2024-01-01 | End: 2024-01-01 | Stop reason: HOSPADM

## 2024-01-01 RX ORDER — LORAZEPAM 2 MG/ML
1 INJECTION INTRAMUSCULAR
Status: DISCONTINUED | OUTPATIENT
Start: 2024-01-01 | End: 2024-01-01 | Stop reason: HOSPADM

## 2024-01-01 RX ORDER — SODIUM CHLORIDE 0.9 % (FLUSH) 0.9 %
5-40 SYRINGE (ML) INJECTION PRN
Status: DISCONTINUED | OUTPATIENT
Start: 2024-01-01 | End: 2024-01-01 | Stop reason: HOSPADM

## 2024-01-01 RX ORDER — 0.9 % SODIUM CHLORIDE 0.9 %
500 INTRAVENOUS SOLUTION INTRAVENOUS ONCE
Status: DISCONTINUED | OUTPATIENT
Start: 2024-01-01 | End: 2024-01-01

## 2024-01-01 RX ORDER — MIDAZOLAM HYDROCHLORIDE 1 MG/ML
INJECTION INTRAMUSCULAR; INTRAVENOUS PRN
Status: DISCONTINUED | OUTPATIENT
Start: 2024-01-01 | End: 2024-01-01 | Stop reason: SDUPTHER

## 2024-01-01 RX ORDER — MORPHINE SULFATE 4 MG/ML
2 INJECTION, SOLUTION INTRAMUSCULAR; INTRAVENOUS
Status: DISCONTINUED | OUTPATIENT
Start: 2024-01-01 | End: 2024-01-01 | Stop reason: HOSPADM

## 2024-01-01 RX ORDER — 0.9 % SODIUM CHLORIDE 0.9 %
1000 INTRAVENOUS SOLUTION INTRAVENOUS ONCE
Status: COMPLETED | OUTPATIENT
Start: 2024-01-01 | End: 2024-01-01

## 2024-01-01 RX ORDER — LORAZEPAM 2 MG/ML
INJECTION INTRAMUSCULAR
Status: COMPLETED
Start: 2024-01-01 | End: 2024-01-01

## 2024-01-01 RX ADMIN — PROPOFOL 125 MCG/KG/MIN: 10 INJECTION, EMULSION INTRAVENOUS at 14:45

## 2024-01-01 RX ADMIN — PROPOFOL 30 MG: 10 INJECTION, EMULSION INTRAVENOUS at 14:51

## 2024-01-01 RX ADMIN — METOPROLOL TARTRATE 5 MG: 5 INJECTION, SOLUTION INTRAVENOUS at 13:45

## 2024-01-01 RX ADMIN — SODIUM CHLORIDE, POTASSIUM CHLORIDE, SODIUM LACTATE AND CALCIUM CHLORIDE: 600; 310; 30; 20 INJECTION, SOLUTION INTRAVENOUS at 15:58

## 2024-01-01 RX ADMIN — CEFTRIAXONE SODIUM 1000 MG: 1 INJECTION, POWDER, FOR SOLUTION INTRAMUSCULAR; INTRAVENOUS at 12:27

## 2024-01-01 RX ADMIN — LORAZEPAM 1 MG: 2 INJECTION INTRAMUSCULAR at 11:40

## 2024-01-01 RX ADMIN — LIDOCAINE HYDROCHLORIDE 50 MG: 10 INJECTION, SOLUTION EPIDURAL; INFILTRATION; INTRACAUDAL; PERINEURAL at 14:45

## 2024-01-01 RX ADMIN — SODIUM CHLORIDE 1000 ML: 9 INJECTION, SOLUTION INTRAVENOUS at 11:41

## 2024-01-01 RX ADMIN — METOPROLOL TARTRATE 2.5 MG: 1 INJECTION, SOLUTION INTRAVENOUS at 18:12

## 2024-01-01 RX ADMIN — Medication 2000 MG: at 14:47

## 2024-01-01 RX ADMIN — POLYVINYL ALCOHOL 1 DROP: 14 SOLUTION/ DROPS OPHTHALMIC at 12:22

## 2024-01-01 RX ADMIN — SODIUM CHLORIDE, POTASSIUM CHLORIDE, SODIUM LACTATE AND CALCIUM CHLORIDE: 600; 310; 30; 20 INJECTION, SOLUTION INTRAVENOUS at 13:16

## 2024-01-01 RX ADMIN — Medication 1 MG: at 11:40

## 2024-01-01 RX ADMIN — MIDAZOLAM 2 MG: 1 INJECTION INTRAMUSCULAR; INTRAVENOUS at 14:54

## 2024-01-01 RX ADMIN — ALBUMIN (HUMAN) 12.5 G: 0.25 INJECTION, SOLUTION INTRAVENOUS at 18:05

## 2024-01-01 RX ADMIN — PROPOFOL 30 MG: 10 INJECTION, EMULSION INTRAVENOUS at 14:46

## 2024-01-01 ASSESSMENT — PAIN - FUNCTIONAL ASSESSMENT
PAIN_FUNCTIONAL_ASSESSMENT: NONE - DENIES PAIN
PAIN_FUNCTIONAL_ASSESSMENT: CRITICAL CARE PAIN OBSERVATION TOOL (CPOT)

## 2024-01-01 ASSESSMENT — ENCOUNTER SYMPTOMS
SHORTNESS OF BREATH: 1
GASTROINTESTINAL NEGATIVE: 1

## 2024-02-23 ENCOUNTER — TELEPHONE (OUTPATIENT)
Age: 39
End: 2024-02-23

## 2024-02-23 ENCOUNTER — TELEPHONE (OUTPATIENT)
Dept: FAMILY MEDICINE CLINIC | Age: 39
End: 2024-02-23

## 2024-02-23 DIAGNOSIS — T85.848S PAIN AROUND PEG TUBE SITE, SEQUELA: Primary | ICD-10-CM

## 2024-02-23 NOTE — TELEPHONE ENCOUNTER
Patel is the  calling in for the patient asking for a referral to get he PEG tube changed it was placed at the [facility]  and he no longer follows there

## 2024-02-23 NOTE — TELEPHONE ENCOUNTER
Spoke to pts  from [facility],  and she informed me that the pt is hard to understand over the phone. Also, that we should schedule the appt through the pts mother. Call the pts mother before 9am in order to schedule the pts appt.   Referral for pain around peg tube site     Melody called the pt earlier to schedule his appt, and the pt informed her that he already got a new peg tube placed.

## 2024-03-20 ENCOUNTER — TELEPHONE (OUTPATIENT)
Age: 39
End: 2024-03-20

## 2024-03-20 NOTE — TELEPHONE ENCOUNTER
Patel from [facility] called and said that the pts mother, Anupama, said that the pt never got a new peg tube placed. The pt cannot talk that well, and we must speak to his mother to schedule any appointments or surgeries.   Referral is in the work q. Please call the pts mother, Anupama Andres, @ [phone]

## 2024-03-20 NOTE — TELEPHONE ENCOUNTER
Anupama returned call ans schedule Arther's procedure.      TM/ [place] / FRIDAY 3/21/2024 at 1:00 pm / EGD PEG TUBE EXCHANGE/ MARJAN    INSTRUCTIONS EMAILED 3/20/2024    PLEASE SEND ORDERS      [facility]   Maikel Billingsley MD, FACS  Lisa Pollack, APRN-CNP  [address], [address]  [place] [address]  P: [phone], F: [phone]      STOP ASPIRIN 7 DAYS PRIOR TO PROCEDURE  STOP ALL OTHER BLOOD THINNERS 5 DAYS PRIOR TO PROCEDURE  STOP IBUPROFEN X-5 DAYS PRIOR TO PROCEDURE  NOTHING TO EAT, DRINK, SMOKE, OR CHEW AFTER MIDNIGHT  You may brush your teeth, rinse gargle, and spit  Heart or blood pressure medication ONLY with a  small sip of water, unless otherwise directed  Shower with regular soap and water  YOU MUST HAVE AN ADULT EITHER DRIVE YOU OR RIDE IN A CAB WITH YOU            MY EXAM IS SCHEDULED FOR;      Pre-testing: 3/21/2024 at,11:00am, A NURSE WILL CALL YOU FROM [facility]    SURGERY DATE:  3/29/2024    TIME:  1:00 pm    ARRIVAL TIME:  11:00 am     LOCATION:  [facility] [place]

## 2024-03-21 ENCOUNTER — HOSPITAL ENCOUNTER (OUTPATIENT)
Dept: PREADMISSION TESTING | Age: 39
Discharge: HOME | End: 2024-03-25

## 2024-03-21 VITALS — BODY MASS INDEX: 19.76 KG/M2 | WEIGHT: 138 LBS | HEIGHT: 70 IN

## 2024-03-21 NOTE — PROGRESS NOTES
Pre-op Instructions For Out-Patient Endoscopy Surgery    Medication Instructions:  Please stop herbs and any supplements now (includes vitamins and minerals).    Please contact your surgeon and prescribing physician for pre-op instructions for any blood thinners.    If you have inhalers/aerosol treatments at home, please use them the morning of your surgery and bring the inhalers with you to the hospital.    Please take the following medications the morning of your surgery with a sip of water:    None    Surgery Instructions:  After midnight before surgery:  Do not eat or drink anything, including water, mints, gum, and hard candy.  You may brush your teeth without swallowing.  No smoking, chewing tobacco, or street drugs.    Please shower or bathe before surgery.       Please do not wear any cologne, lotion, powder, jewelry, piercings, perfume, makeup, nail polish, hair accessories, or hair spray on the day of surgery.  Wear loose comfortable clothing.    Leave your valuables at home but bring a payment source for any after-surgery prescriptions you plan to fill at [facility].  Bring a storage case for any glasses/contacts.    An adult who is responsible for you MUST drive you home and should be with you for the first 24 hours after surgery.     The Day of Surgery:  Arrive at [facility] Surgery Entrance at the time directed by your surgeon and check in at the desk.     If you have a living will or healthcare power of , please bring a copy.    You will be taken to the pre-op holding area where you will be prepared for surgery.  A physical assessment will be performed by a nurse practitioner or house officer.  Your IV will be started and you will meet your anesthesiologist.    When you go to surgery, your family will be directed to the surgical waiting room, where the doctor should speak with them after your surgery.    After surgery, you will be taken to the recovery area.  When you

## 2024-03-22 NOTE — TELEPHONE ENCOUNTER
PAT orders per anesthesia protocol.  No pre-op ATB needed.  Will not send viscous lidocaine- hx of dysphagia.  Signing encounter.

## 2024-03-28 ENCOUNTER — TELEPHONE (OUTPATIENT)
Age: 39
End: 2024-03-28

## 2024-03-28 NOTE — TELEPHONE ENCOUNTER
Called patient's mother Anupama Eaton, left message to call back. We have to cancel patient's surgery and need to schedule patient a office visit first.

## 2024-03-28 NOTE — TELEPHONE ENCOUNTER
Called patient's mother Anupama Eaton, left message to call back. We have to cancel patient's surgery and need to schedule patient a office visit first.      Called patients sister and left message to call for an office visit , patients surgery has been canceled due to needing an office visit first. Sister stated she will call her mother to have her call the office to schedule an appointment.

## 2024-04-04 ENCOUNTER — TELEPHONE (OUTPATIENT)
Dept: FAMILY MEDICINE CLINIC | Age: 39
End: 2024-04-04

## 2024-04-04 NOTE — TELEPHONE ENCOUNTER
Please notify patient and home visiting nurse, if his breathing gets worse and heart rate increased, he needs to go to the ER.  For now she can continue to monitor.

## 2024-04-04 NOTE — TELEPHONE ENCOUNTER
Call received from PT Anisa stating that the pts heart rate has been running high and while resting pts hr is between 110-118. Caller adv she wanted to make provider aware. In addition Anisa states the pt appears to be having difficulty clearing his throat as well.     Pts next appt with PCP: 10/14/2024

## 2024-04-05 NOTE — TELEPHONE ENCOUNTER
Spoke w/pt and PT melina Lange to monitor breathing and hr and if it worsens to go to ED. Both verbalized understanding.

## 2024-04-11 ENCOUNTER — TELEPHONE (OUTPATIENT)
Dept: FAMILY MEDICINE CLINIC | Age: 39
End: 2024-04-11

## 2024-04-11 ENCOUNTER — OFFICE VISIT (OUTPATIENT)
Age: 39
End: 2024-04-11
Payer: COMMERCIAL

## 2024-04-11 ENCOUNTER — TELEPHONE (OUTPATIENT)
Age: 39
End: 2024-04-11

## 2024-04-11 VITALS
WEIGHT: 135 LBS | BODY MASS INDEX: 19.33 KG/M2 | TEMPERATURE: 99 F | DIASTOLIC BLOOD PRESSURE: 81 MMHG | HEART RATE: 123 BPM | OXYGEN SATURATION: 90 % | HEIGHT: 70 IN | SYSTOLIC BLOOD PRESSURE: 115 MMHG

## 2024-04-11 DIAGNOSIS — Z93.1 S/P PERCUTANEOUS ENDOSCOPIC GASTROSTOMY (PEG) TUBE PLACEMENT (HCC): ICD-10-CM

## 2024-04-11 DIAGNOSIS — G12.21 ALS (AMYOTROPHIC LATERAL SCLEROSIS) (HCC): Primary | ICD-10-CM

## 2024-04-11 DIAGNOSIS — E46 MALNUTRITION, UNSPECIFIED TYPE (HCC): ICD-10-CM

## 2024-04-11 PROCEDURE — 99203 OFFICE O/P NEW LOW 30 MIN: CPT | Performed by: NURSE PRACTITIONER

## 2024-04-11 PROCEDURE — G8420 CALC BMI NORM PARAMETERS: HCPCS | Performed by: NURSE PRACTITIONER

## 2024-04-11 PROCEDURE — G8427 DOCREV CUR MEDS BY ELIG CLIN: HCPCS | Performed by: NURSE PRACTITIONER

## 2024-04-11 PROCEDURE — 4004F PT TOBACCO SCREEN RCVD TLK: CPT | Performed by: NURSE PRACTITIONER

## 2024-04-11 ASSESSMENT — ENCOUNTER SYMPTOMS
RHINORRHEA: 0
COUGH: 0
SORE THROAT: 0
SHORTNESS OF BREATH: 0

## 2024-04-11 NOTE — TELEPHONE ENCOUNTER
Talk with Anisa Physical Therapist - for patient  He been having high Heart Rate  she will send Document over . Verbal Ok for Nursing to go out and see the patient - He did not have Home Nurse .

## 2024-04-11 NOTE — TELEPHONE ENCOUNTER
Case discussed with Dr. Billingsley. Patient requested that his mother be called to discuss surgery scheduling. Please let her know that I discussed case with Dr. Billingsley and he is recommending PEG tube removal and replacement under MAC anesthesia. Please see surgery scheduling details below:    SURGERY/PROCEDURE SCHEDULING  PROCEDURE NAME: PEG tube removal and replacement    PROCEDURE DIAGNOSIS: Malnutrition     ANESTHESIA TYPE: MAC    BLOOD THINNERS: None    CLEARANCE: None    HOSPITAL: [facility]    Pre-admission testing per protocol with [facility]. Procedure risk and complications, risk-benefit alternatives, recovery restrictions were all discussed with the patient at length.  Pre-operative, intra-operative, post-operative details regarding procedure discussed with patient and all questions were answered. Patient understands and wants to proceed.

## 2024-04-12 NOTE — TELEPHONE ENCOUNTER
Melody, can you please fill out a surgery scheduling sheet for this patient and give to Karyna as well? Thanks.

## 2024-04-13 PROBLEM — E46 MALNUTRITION (HCC): Status: ACTIVE | Noted: 2024-04-13

## 2024-04-13 PROBLEM — Z93.1 S/P PERCUTANEOUS ENDOSCOPIC GASTROSTOMY (PEG) TUBE PLACEMENT (HCC): Status: ACTIVE | Noted: 2024-04-13

## 2024-04-16 ENCOUNTER — TELEPHONE (OUTPATIENT)
Age: 39
End: 2024-04-16

## 2024-04-19 NOTE — TELEPHONE ENCOUNTER
Patient's  mother returned rishabh to schedule a procedure.    TM/ SC/  Friday 5/10/2024 AT 2:15 PM / PEG TUBE REMOVAL AND REPLACEMENT/ TYRA    PATIENT FULLY INSTRUCTED/ EMAILED      [facility]   Maikel Billingsley MD, FACS  Lisa Pollack, APRN-CNP  [address], [address]  [place] [address]  P: [phone], F: [phone]      STOP ASPIRIN 7 DAYS PRIOR TO PROCEDURE  STOP ALL OTHER BLOOD THINNERS 5 DAYS PRIOR TO PROCEDURE  NOTHING TO EAT, DRINK, SMOKE, OR CHEW AFTER MIDNIGHT  You may brush your teeth, rinse gargle, and spit  Heart or blood pressure medication ONLY with a  small sip of water, unless otherwise directed  Shower with regular soap and water  YOU MUST HAVE AN ADULT EITHER DRIVE YOU OR RIDE IN A CAB WITH YOU            MY EXAM IS SCHEDULED FOR;      Pre-testin2024 at 2:00 PM, A NURSE FORM [facility] WILL CALL YOU    SURGERY DATE:  5/10/2024    TIME:  2:15 pm    ARRIVAL TIME:  12:15 pm    LOCATION:  [facility]    Post Op appointment at [facility]:  2024 at 3:15 pm WITH WARREN BLAND

## 2024-04-23 ENCOUNTER — TELEPHONE (OUTPATIENT)
Age: 39
End: 2024-04-23

## 2024-04-23 NOTE — TELEPHONE ENCOUNTER
You answered all of the questions correctly.  He does need to be put under a MAC anesthesia which is a lighter form of anesthesia compared to general anesthesia.  He will not be fully under a general anesthesia but will be comfortable and will not be in pain.  He does need to have an IV.  We do need to do this procedure under anesthesia.  Please let me know if he has any other questions.  He will be able to speak to Dr. Billingsley and the anesthesiologist prior to his procedure in preop as well.

## 2024-04-23 NOTE — TELEPHONE ENCOUNTER
Pt called with a few questions and concerns about his upcoming procedure for a PEG tub removal and replacement on 05/10/2024.  The pt asked the following questions: Why can't we do the procedure in the office? Is he being fully put under? Is an IV mandatory?   I answered the pts questions to the best of my ability. I explained to the pt that we do not do surgical procedures in the office, especially since we do not have the proper equipment in our office. I then told the pt that he'll be put under MAC anesthesia which he'll still be awake during the procedure, but he'll be relaxed and in no pain. I explained to the pt that the hospital usually administrates drugs through the IV, but I'll still bring this to Dr. Billingsley's attention. The pt seems very nervous about being put under and about the IV. The pt then asked if he has to be put under for this procedure?

## 2024-04-26 ENCOUNTER — TELEPHONE (OUTPATIENT)
Age: 39
End: 2024-04-26

## 2024-05-01 ENCOUNTER — TELEPHONE (OUTPATIENT)
Age: 39
End: 2024-05-01

## 2024-05-01 NOTE — TELEPHONE ENCOUNTER
Patient mother Aurea called and canceled patient surgery , due to having health issues. Mother Aurea will call back to reschedule at a later date. Patient is have breath issues.

## 2024-05-03 ENCOUNTER — TELEPHONE (OUTPATIENT)
Age: 39
End: 2024-05-03

## 2024-05-06 NOTE — TELEPHONE ENCOUNTER
Patient's parent (Anupama) called and schedule patient's procedure.    PATIENT FULLY INSTRUCTED AND / EMAILED 2024    Please send orders.      [facility]   Maikel Billingsley MD, FACS  Lisa Pollack, APRN-CNP  [address], [address]  [place] [address]  P: [phone], F: [phone]      STOP ASPIRIN 7 DAYS PRIOR TO PROCEDURE  STOP ALL OTHER BLOOD THINNERS 5 DAYS PRIOR TO PROCEDURE  NOTHING TO EAT, DRINK, SMOKE, OR CHEW AFTER MIDNIGHT  You may brush your teeth, rinse gargle, and spit  Heart or blood pressure medication ONLY with a  small sip of water, unless otherwise directed  Shower with regular soap and water  YOU MUST HAVE AN ADULT EITHER DRIVE YOU OR RIDE IN A CAB WITH YOU            MY EXAM IS SCHEDULED FOR;      Pre-testin2024 at 12:30 pm a nurse will all you    SURGERY DATE:  2024    TIME:  2:30 PM    ARRIVAL TIME:  12:30 PM    LOCATION:  [facility]    Post Op appointment at [facility]:  2024 at 3:15 PM with Dr. Jose Cruz LEÓN

## 2024-05-07 ENCOUNTER — HOSPITAL ENCOUNTER (OUTPATIENT)
Dept: PREADMISSION TESTING | Age: 39
Discharge: HOME | End: 2024-05-11

## 2024-05-07 VITALS — HEIGHT: 69 IN | WEIGHT: 130 LBS | BODY MASS INDEX: 19.26 KG/M2

## 2024-05-07 NOTE — PROGRESS NOTES
Pre-op Instructions For Out-Patient Surgery    Medication Instructions:  Please stop herbs and any supplements now (includes vitamins and minerals).    Please contact your surgeon and prescribing physician for pre-op instructions for any blood thinners.     If you have inhalers/aerosol treatments at home, please use them the morning of your surgery and bring the inhalers with you to the hospital.    Please take the following medications the morning of your surgery with a sip of water:    None     Surgery Instructions:  After midnight before surgery:  Do not eat or drink anything, including water, mints, gum, and hard candy.  You may brush your teeth without swallowing.  No smoking, chewing tobacco, or street drugs.    Please shower or bathe before surgery.       Please do not wear any cologne, lotion, powder, deodorant, jewelry, piercings, perfume, makeup, nail polish, hair accessories, or hair spray on the day of surgery.  Wear loose comfortable clothing.    Leave your valuables at home but bring a payment source for any after-surgery prescriptions you plan to fill at [facility].  Bring a storage case for any glasses/contacts.    An adult who is responsible for you MUST drive you home and should be with you for the first 24 hours after surgery.     If having out-patient knee and foot surgeries, please arrange for planned crutches, walker, or wheelchair before arriving to the hospital.    The Day of Surgery:  Arrive at [facility] Entrance at the time directed by your surgeon and check in at the desk.     If you have a living will or healthcare power of , please bring a copy.    You will be taken to the pre-op holding area where you will be prepared for surgery.  A physical assessment will be performed by a nurse practitioner or house officer.  Your IV will be started and you will meet your anesthesiologist.    When you go to surgery, your family

## 2024-05-15 NOTE — PRE-PROCEDURE INSTRUCTIONS
No answer, left message ?         YES                    Unable to leave message ?    When were you told to arrive at hospital ?  1230    Do you have a  ?    Are you on any blood thinners ?                     If yes when did you stop taking ?    Do you have your prep Rx filled and instruction ?      Nothing to eat the day before , only clear liquids.    Are you experiencing any covid symptoms ?     Do you have any infections or rash we should be aware of ?      Do you have the Hibiclens soap to use the night before and the morning of surgery ?    Nothing to eat or drink after midnight, only a sip of water to take any medication instructed to take the night before.  Wear comfortable clothing, leave any valuables at home, remove any jewelry and body piercing .   MESSAGE LEFT WITH DETAILS.

## 2024-05-17 NOTE — ANESTHESIA POSTPROCEDURE EVALUATION
Department of Anesthesiology  Postprocedure Note    Patient: Navin Suarez III  MRN: [id number]  YOB: 1985  Date of evaluation: 5/17/2024    Procedure Summary       Date: 05/17/24 Room / Location: [place] [address] / [facility]    Anesthesia Start: 1441 Anesthesia Stop: 1520    Procedure: PEG TUBE REMOVAL & REPLACEMENT (Esophagus) Diagnosis:       Malnutrition, unspecified type (HCC)      ALS (amyotrophic lateral sclerosis) (HCC)      (Malnutrition, unspecified type (HCC) [E46])      (ALS (amyotrophic lateral sclerosis) (HCC) [G12.21])    Surgeons: Maikel Billingsley MD Responsible Provider: Patti Linton MD    Anesthesia Type: general ASA Status: 4            Anesthesia Type: No value filed.    Marielle Phase I: Marielle Score: 4    Marielle Phase II:      Anesthesia Post Evaluation    Comments: POST- ANESTHESIA EVALUATION       Pt Name: Navin Suarez III  MRN: [id number]  YOB: 1985  Date of evaluation: 5/17/2024  Time:  6:40 PM      /83   Pulse (!) 123   Temp 98.7 °F (37.1 °C) (Infrared)   Resp (!) 42   Ht 1.753 m (5' 9\")   Wt 59 kg (130 lb)   SpO2 96%   BMI 19.20 kg/m²      Consciousness Level  Awake  Cardiopulmonary Status  Tachycardia in post op - responded to Lopressor after fluid boluses  Pain Adequately Treated YES  Nausea / Vomiting  NO  Adequate Hydration  YES  Anesthesia Related Complications NONE     Patient in PACU indicating he was having problem breathing and at the same time his HR was up to the 150s. Because he uses BiPAP at home, respiratory therapy was contacted to set him up on BiPAP. This helped relieve his breathing issues. Fluid boluses and Lopressor was given for his increased heart rate. Mother was informed of the above. After about 3hrs in PACU - HR was noted to go up again. He was more awake, the BiPAP was taken off after he was weaned off oxygen. Patient and the mother were advised to go to the ER for further management but they declined this

## 2024-05-17 NOTE — OP NOTE
[facility]   Maikel Billingsley MD, FACS  Lisa JONESBobbi Ranjeet, APRN-CNP  [address], [address]  [place] [address]  P: [phone], F: [phone]      ESOPHAGOGASTRODUODENOSCOPY   ( EGD )    DATE OF PROCEDURE: 5/17/2024     SURGEON: Maikel Billingsley MD    ASSISTANT: None    PREOPERATIVE DIAGNOSIS: Malnutrition.  Dysphagia.  History of ALS.  Malfunctioning G-tube.    POSTOPERATIVE DIAGNOSIS: Same.  Hiatal hernia.  Possible Holland's esophagus.    OPERATION: 1.Esophagogastroduodenoscopy with biopsy GE junction.  2.Removal of previously placed RAFI tube.  3.Replacement with 20 Niuean percutaneous endoscopic gastrostomy tube    ANESTHESIA: Moderate Sedation     ESTIMATED BLOOD LOSS: None    COMPLICATIONS: None.     SPECIMENS:  Was Obtained: GE junction biopsies    HISTORY: The patient is a 38 y.o. year old male with history of above preop diagnosis.  I recommended esophagogastroduodenoscopy with possible biopsy and I explained the risk, benefits, expected outcome, and alternatives to the procedure.  Risks included but are not limited to bleeding, infection, respiratory distress, hypotension, and perforation of the esophagus, stomach, or duodenum.  Patient understands and is in agreement.    PROCEDURE: The patient was given IV conscious sedation.  The patient's SPO2 remained above 90% throughout the procedure. Cetacaine spray given.  Patient placed in left lateral position.  Olympus  videogastroscope was inserted orally under vision into the esophagus without difficulty and advanced into the stomach then through the pylorus up to the second part of duodenum.      Findings:    Retropharyngeal area was grossly normal appearing    Esophagus: abnormal: Sliding-type hiatal hernia.  Possible Holland's esophagus.  Biopsies obtained from GE junction.  No obstruction noted.    Stomach:    Fundus and Cardia Examined in Retroflexed View: normal    Body: Mid tube in place in the mid body     Antrum: Normal    Duodenum:     Descending: normal    Bulb: normal    Scope was withdrawn back into the stomach.  Previously placed RAFI tube was removed by decompressing the balloon.  Through the tract guidewire was introduced.  Guidewire was grasped with the help of the snare.  Guidewire was retrieved from the oral aspect.  PEG tube was threaded over the guidewire.  PEG tube was retrieved from the abdominal aspect.  Bolster was applied which was approximately at 3 cm.  Gastroscope was reintroduced.  Mushroom of the PEG was found to be in satisfactory position.  No bleeding noted.  Air was decompressed and scope was removed.    While withdrawing the scope the above findings were verified and the scope was removed.  The patient tolerated the procedure and conscious sedation without unusual events.    In the recovery room patient was examined and remains hemodynamically stable.  Discharge home when criteria met.     Recommendations/Plan:   F/U Biopsies  F/U In Office as instructed  Discussed with the family  PEG tube can be used for medications and feedings today.  Discharge to home when criteria met    Electronically signed by Maikel Billingsley MD  on 5/17/2024 at 3:13 PM

## 2024-05-17 NOTE — DISCHARGE INSTRUCTIONS
DISCHARGE INSTRUCTIONS FOR GENERAL ANESTHESIA    In order to continue your care at home, please follow the instructions below.    Anesthesia - Do not drink any alcoholic beverages or make any legal or important decisions for 24 hours. If your surgery was on an extremity or abdomen, do not drive or operate any machinery until your surgeon approves, otherwise do not drive or operate any machinery for 24 hours or as restricted by your surgeon.     Pain Medications - Please do not drive, operate machinery, or drink alcoholic beverages while taking any prescribed pain medication.     Diet -  Start out eating lightly (broth, soup, crackers, toast, etc.) advancing as tolerated to your usual diet.  Try to avoid spicy and greasy/fatty foods for 24 hours. Drink plenty of fluids after surgery, unless you are on a fluid restriction.  Avoid milk/milk product for several hours.    Call your surgeon for the following:  You have pain that does not get better after you take pain medicine.   For an oral temperature (by mouth) is 101 degrees or higher, chills, or excessive sweating.  You have increasing and progressive bleeding or drainage from surgery site.  Signs of an infection:  increased swelling, redness, warmth, or hardness around surgery area or yellow or green drainage.  Persistent nausea or vomiting and can’t keep fluids down.  If you are unable to urinate within 8 hours of surgery.  Redness or swelling at IV site.  For any questions or concerns you may have.       PEG tube can be used for medications and feedings today.

## 2024-05-17 NOTE — ANESTHESIA PRE PROCEDURE
Allison Webber MD           Allergies:    Allergies   Allergen Reactions    Vicodin [Hydrocodone-Acetaminophen] Other (See Comments)     Pt states that he blacks out.       Problem List:    Patient Active Problem List   Diagnosis Code    Cleft lip Q36.9    Elevated CPK R74.8    Weakness of both lower extremities R29.898    Vitamin D deficiency E55.9    Foot drop, bilateral M21.371, M21.372    Abnormal EMG R94.131    Spinal stenosis of lumbar region with radiculopathy M48.061, M54.16    Neuropathy, peripheral, hereditary G60.9    ALS (amyotrophic lateral sclerosis) ([facility]) G12.21    Tinea versicolor B36.0    Chronic neuromuscular respiratory failure ([facility]) J96.10    Dysphagia R13.10    Falls frequently R29.6    Motor polyneuropathy G62.89    Restrictive lung disease J98.4    Malnutrition ([facility]) E46    S/P percutaneous endoscopic gastrostomy (PEG) tube placement ([facility]) Z93.1       Past Medical History:        Diagnosis Date    ALS (amyotrophic lateral sclerosis) ([facility])     Cleft lip     Foot drop, bilateral        Past Surgical History:        Procedure Laterality Date    APPENDECTOMY      CLEFT LIP REPAIR      MANDIBLE SURGERY         Social History:    Social History     Tobacco Use    Smoking status: Former     Current packs/day: 0.00     Types: Cigarettes     Start date: 2002     Quit date: 10/20/2019     Years since quittin.5    Smokeless tobacco: Never   Substance Use Topics    Alcohol use: No                                Counseling given: Not Answered      Vital Signs (Current):   Vitals:    24 1256   BP: 96/67   Pulse: 93   Resp: 18   Temp: 97.5 °F (36.4 °C)   TempSrc: Infrared   SpO2: 96%   Weight: 59 kg (130 lb)   Height: 1.753 m (5' 9\")                                              BP Readings from Last 3 Encounters:   24 96/67   24 115/81   10/12/23 114/70       NPO Status: Time of last liquid consumption:                         Time of last solid consumption:

## 2024-05-17 NOTE — H&P
Single   Occupational History    Occupation:      Comment: fulltime   Tobacco Use    Smoking status: Former     Current packs/day: 0.00     Types: Cigarettes     Start date: 2002     Quit date: 10/20/2019     Years since quittin.5    Smokeless tobacco: Never   Vaping Use    Vaping Use: Never used   Substance and Sexual Activity    Alcohol use: No    Drug use: Yes     Types: Marijuana (Weed)     Comment: occ    Sexual activity: Not Currently     Social Determinants of Health     Financial Resource Strain: Low Risk  (2023)    Overall Financial Resource Strain (CARDIA)     Difficulty of Paying Living Expenses: Not hard at all   Transportation Needs: Unknown (2023)    PRAPARE - Transportation     Lack of Transportation (Non-Medical): No   Physical Activity: Inactive (10/12/2023)    Exercise Vital Sign     Days of Exercise per Week: 0 days     Minutes of Exercise per Session: 0 min   Housing Stability: Unknown (2023)    Housing Stability Vital Sign     Unstable Housing in the Last Year: No           REVIEW OF SYSTEMS      Allergies   Allergen Reactions    Vicodin [Hydrocodone-Acetaminophen] Other (See Comments)     Pt states that he blacks out.       No current facility-administered medications on file prior to encounter.     Current Outpatient Medications on File Prior to Encounter   Medication Sig Dispense Refill    loperamide (IMODIUM) 2 MG capsule Take 1 capsule by mouth 4 times daily as needed      Misc. Devices (WHEELCHAIR) MISC Please provide portable wheelchair with arms which can come off to slide. 1 each 0    Misc. Devices (ADJUST BATH/SHOWER SEAT/BACK) MISC Use daily for taking shower 1 each 0    Misc. Devices (WALL GRAB BAR) MISC Use daily for taking shower 1 each 0    Misc. Devices MISC Please provide ramp for car 1 Device 0    Misc. Devices MISC Please provide shower head 1 Device 0    Misc. Devices MISC Please provide hand brake for car 1 Device 0    Handicap Placard MISC by

## 2024-05-19 NOTE — ED PROVIDER NOTES
[facility] ED  Emergency Department Encounter  Emergency Medicine Resident     Pt Name:Navin Suarez III  MRN: [id number]  Birthdate 1985  Date of evaluation: 24  PCP:  Judith Mann MD  Note Started: 11:30 AM EDT      CHIEF COMPLAINT       Chief Complaint   Patient presents with    Shortness of Breath    Tachycardia       HISTORY OF PRESENT ILLNESS  (Location/Symptom, Timing/Onset, Context/Setting, Quality, Duration, Modifying Factors, Severity.)      Navin Suarez III is a 38 y.o. male who presents emergency department by EMS after an episode of respiratory distress, patient reported shortness of breath.  Has a known history of ALS and recently signed a DNR CC form.  EMS did attempt to place patient on BiPAP, he did not tolerate.  On arrival patient is unresponsive, unable to answer questions.  Mom is at bedside and able to provide further history.  Patient is scheduled himself for hospice evaluation after being recently told that he had 35% lung function.  Also had a PEG tube placed recently at [facility].  PEG tube has been functioning fine mom's had no issues with it.    PAST MEDICAL / SURGICAL / SOCIAL / FAMILY HISTORY      has a past medical history of ALS (amyotrophic lateral sclerosis) (HCC), Cleft lip, and Foot drop, bilateral.       has a past surgical history that includes Cleft lip repair; Appendectomy; Mandible surgery; and Upper gastrointestinal endoscopy (N/A, 2024).      Social History     Socioeconomic History    Marital status: Single     Spouse name: Not on file    Number of children: Not on file    Years of education: Not on file    Highest education level: Not on file   Occupational History    Occupation:      Comment: fulltime   Tobacco Use    Smoking status: Former     Current packs/day: 0.00     Types: Cigarettes     Start date: 2002     Quit date: 10/20/2019     Years since quittin.5    Smokeless tobacco: Never   Vaping Use     head 8/17/20   Judith Mann MD   Misc. Devices MISC Please provide hand brake for car 7/28/20   Judith Mann MD   Handconcha Sargent MISC by Does not apply route Expires on 3/1/21 2/26/19   Judith Mann MD         REVIEW OF SYSTEMS       Review of Systems   Unable to perform ROS: Patient unresponsive       PHYSICAL EXAM      INITIAL VITALS:   BP (!) 38/28   Pulse (!) 30   Temp 98.8 °F (37.1 °C) (Oral) Comment (Src): subuccal  Resp (!) 33   SpO2 (!) 53%     Physical Exam  Constitutional:       Appearance: He is toxic-appearing.      Comments: Unresponsive   HENT:      Head: Normocephalic and atraumatic.   Eyes:      Comments: Pupils 3 mm equal reactive   Cardiovascular:      Rate and Rhythm: Tachycardia present.   Pulmonary:      Comments: Tachypneic, accessory muscle use, coarse breath sounds on the right side, decreased breath sounds bilaterally.  Abdominal:      Palpations: Abdomen is soft.   Musculoskeletal:         General: Normal range of motion.      Right lower leg: No tenderness.      Left lower leg: No tenderness.   Skin:     General: Skin is warm.      Capillary Refill: Capillary refill takes less than 2 seconds.   Neurological:      General: No focal deficit present.           DDX/DIAGNOSTIC RESULTS / EMERGENCY DEPARTMENT COURSE / MDM     Medical Decision Making  38-year-old male presents the emergency department due to respiratory distress, on arrival patient is unresponsive on nonrebreather satting in the 70s.  EMS provided DNR CC paperwork, patient is in sinus tachycardia concern for sepsis and did discuss with mom that she would like everything done that would make him comfortable but he was to be evaluated by hospice.  G-tube recently placed, no issues functioning appropriately per mom.  Chest x-ray, labs ordered, will treat any infection.  Will provide pain medication and anxiolysis for patient due to DNR status.  Will plan on consulting palliative, hospice and likely admission.    Amount

## 2024-05-19 NOTE — ED NOTES
Pt presents to ED room 13 via EMS following an episode of shortness of breath. Pt has a hx of ALS and is a known DNR CC. Pt presents with NRB in place. Current Spo2 at 88% on 15L NRB. 125 mg of solumedrol given PTA. Pt tachycardic on arrival at 154 bpm. Monitor attached. IV establish by squad. Pt resting in bed with family at bedside. Call light within reach. Will continue with plan of care.

## 2024-05-19 NOTE — CONSULTS
Clinical Ethics Consultation Note    History and Context:    Patient has ALS and arrived in ED with a signed  DO NOT RESUSCITATE comfort care.  Daughter is asking to change the code status to DNR-CCA.    Age: 38 y.o.  Gender: male  Gender Identity: male  Admitted Date: 5/19/2024  Consult Date: 05/19/24  MRN: [id number]  Valid Advanced Medical Directive: Yes    Process:  Spoke with physician; clarified Code Status    Ethical Question(s) and Concern(s):  DNR-CC in effect.  Does daughter have right to revoke/change to DNR-CCA?    Analysis:  ORC states \"Absent substantial change in the principal's medical condition as assessed by the principal's authorized health care provider, DNR identification based upon a valid DNR order to which the principal consented supersedes the authority of a durable power of  for health care and the agent or agents named therein.\"    Patient wishes are codified in code status and per mom doesn't want additional aggressive interventions.    Recommendations:    Recommend conversation with daughter that conveys duty of hospital to uphold the patient wishes, and, according to ORC, DNR-CC order cannot be revoked without a substantial change to medical condition.    Offer spiritual health and palliative as support.  Ethics consultant offered to have conversation with family if requested.    Closing:  Thank you for consulting ethics.  If I can provide additional support, please call.  [phone].    Milena Becker  N/A    Primary Ethical Theme: Primary Ethical Themes: Code status change  Secondary Ethical Theme:

## 2024-05-19 NOTE — PROGRESS NOTES
SPIRITUAL HEALTH - [facility] [place]  PROGRESS NOTE    Shift date: 5/19/2024   Shift day: Sunday   Shift # 1    Room # 13/13   Name: Navin Suarez III                Faith: None   Place of Jainism:     Referral:  End-of-life    Admit Date & Time: 5/19/2024 10:43 AM    Assessment:  Navin Suarez III is a 38 y.o. male. Upon entering the room writer observes family at bedside appearing sad but to be coping.       Intervention:  Writer introduced self and title as  Writer offered space for family  to express feelings, needs, and concerns and provided a ministry presence. Family stated that they had no needs at this time but expressed appreciation for support.   provided support through active listening and words of affirmation, and assurance of prayers.    Outcome:  Family was receptive to spiritual health visit and expressed gratitude for support.     Plan:  Chaplains will remain available to offer spiritual and emotional support as needed.      Electronically signed by Chaplain Alex, on 5/19/2024 at 11:24 AM.  Spiritual Health  [facility]  [phone]      05/19/24 1123   Encounter Summary   Encounter Overview/Reason Grief, Loss, and Adjustments   Service Provided For Family   Referral/Consult From Other (comment)  (ED HUC)   Last Encounter  05/19/24   Complexity of Encounter Moderate   Begin Time 1115   End Time  1125   Total Time Calculated 10 min   Spiritual/Emotional needs   Type Spiritual Support   Assessment/Intervention/Outcome   Assessment Coping   Intervention Active listening;Explored/Affirmed feelings, thoughts, concerns;Prayer (assurance of)/[place]   Outcome Expressed feelings, needs, and concerns;Expressed Gratitude;Receptive

## 2024-05-19 NOTE — ED PROVIDER NOTES
[facility] ED  eMERGENCY dEPARTMENT eNCOUnter   Attending Attestation     Pt Name: Navin Suarez III  MRN: [id number]  Birthdate 1985  Date of evaluation: 24       Navin Suarez III is a 38 y.o. male who presents with Shortness of Breath and Tachycardia      12:20 PM EDT      History: Patient presents with shortness of breath and tachycardia.  Patient has ALS and has signed a DO NOT RESUSCITATE comfort care recently with his neurologist at [facility].  Mother in the room states that the patient did not want any further invasive care.  She says that he is relatively holistic and does not want significant medications.  Initially they agreed to steroid with EMS for possible breathing issues.  Patient not tolerating BiPAP for EMS, patient became more unresponsive.  Patient initial sat in the emergency department on arrival from EMS was 77.  Patient had cyanosis of the limbs.    Exam: Heart rate elevated.  Lungs diminished, very shallow respirations.  Patient has cyanosis of the hands initially.  Patient has some seizure-like activity with eyes rolling back of his head and twitching.  Patient unresponsive.  Abdomen is soft, nondistended.    Given CODE STATUS and long discussion with mother mother would like him to be comfortable and except some fluids and her other medications that may make him comfortable.  She says that we could give antibiotics if necessary.    Patient's daughter arrived and would like to change CODE STATUS to CCA.  We did discuss this with her and patient's daughter agrees to keep [facility] that was his wishes.    Plan for admission to palliative.  Will continue to ensure the patient is comfortable.      Pt desaturated, became bradycardic and went into asystole. Pt was declared  by Resident. I was in a patient room at that time and was notified directly after.       I performed a history and physical examination of the patient and discussed management with the

## 2024-05-19 NOTE — ED NOTES
transfers with assistive devices or assistance; Unable to ambulate or transer.: Yes, Nursing Judgement: Yes    Diagnosis:  DISPOSITION Decision To Admit 05/19/2024 12:10:19 PM   Final diagnoses:   Hypoxia        Consults:  IP CONSULT TO PALLIATIVE CARE  IP CONSULT TO HOSPICE  IP CONSULT TO ETHICS  IP CONSULT TO HOSPITALIST     Treatment Team:   Treatment Team: Attending Provider: Cristhian Iverson MD; Resident: Dana Cruz MD; Registered Nurse: Jhoan Franklin RN    Treatment:  ED Course as of 05/19/24 1239   Sun May 19, 2024   1200 [facility], they would like case management hospice to be involved as they do not believe there is much to provide patient with admission at this time. [SS]   1211 Initial history obtained from patient's mother.  Patient's 19-year-old daughter not present at bedside, did have questions about patient's CODE STATUS, was questioning whether CCA would be more beneficial for patient as she would be his next of kin.  Did discuss with patient that we have a signed DNR CC that was signed by the patient recently, she also agrees that he had mentioned to her that he is done fighting his ALS as he was recently told that he has minimal lung function at this point.  Patient is still unresponsive, daughter initially was considering switching his CODE STATUS to DNR CCA but upon further discussion she agrees that CCU would be best for him and would be most compliant with his wishes. [SS]   1212 Right lower lobe pneumonia [SS]   1235 Time of death,  [SS]      ED Course User Index  [SS] Dana Cruz MD          Skin Assessment:        Pain Score:         SOCIAL HISTORY       Social History     Socioeconomic History    Marital status: Single   Occupational History    Occupation:      Comment: fulltime   Tobacco Use    Smoking status: Former     Current packs/day: 0.00     Types: Cigarettes     Start date: 6/14/2002     Quit date: 10/20/2019     Years since quitting:  % ophthalmic solution 1 drop    cefTRIAXone (ROCEPHIN) 1,000 mg in sodium chloride 0.9 % 50 mL IVPB (mini-bag)     Order Specific Question:   Antimicrobial Indications     Answer:   Pneumonia (CAP)    azithromycin (ZITHROMAX) 500 mg in sodium chloride 0.9 % 250 mL IVPB ([id number])     Order Specific Question:   Antimicrobial Indications     Answer:   Pneumonia (CAP)       SURGICAL HISTORY       Past Surgical History:   Procedure Laterality Date    APPENDECTOMY      CLEFT LIP REPAIR      MANDIBLE SURGERY      UPPER GASTROINTESTINAL ENDOSCOPY N/A 5/17/2024    PEG TUBE REMOVAL & REPLACEMENT performed by Maikel Billingsley MD at [facility]       PAST MEDICAL HISTORY       Past Medical History:   Diagnosis Date    ALS (amyotrophic lateral sclerosis) (HCC)     Cleft lip     Foot drop, bilateral        Labs:  Labs Reviewed   CBC - Abnormal; Notable for the following components:       Result Value    WBC 24.1 (*)     Hematocrit 55.7 (*)     .9 (*)     Platelets 524 (*)     All other components within normal limits   BASIC METABOLIC PANEL - Abnormal; Notable for the following components:    Sodium 156 (*)     Chloride 122 (*)     CO2 15 (*)     Anion Gap 19 (*)     Glucose 169 (*)     Creatinine 0.5 (*)     All other components within normal limits   LACTIC ACID   URINALYSIS WITH REFLEX TO CULTURE       Electronically signed by Jhoan Franklin RN on 5/19/2024 at 12:14 PM

## 2024-05-19 NOTE — ED NOTES
Dr. Murray and writer at bedside to evaluate patient. Pt in asystole on monitor. Will continue with plan of care.

## 2024-05-19 NOTE — PROGRESS NOTES
[facility] [place]  DEATH   Shift date: 2024      Shift day:   Shift #: 1                 Room #      Name: Navin Suarez III            Age: 38 y.o.  Gender: male          Hinduism: None        Place of Congregation:   Admit Date & Time: 2024 10:43 AM       Referral: ED   Actual date of death: 2024   TOD: 12:33 PM       SITUATION AT DEATH:  Patient was admitted to ER 13 before he  at 12:33 PM.     IS THIS A 'S CASE?  No    SPIRITUAL/EMOTIONAL INTERVENTION:   provided ministry of presence, offered support and prayed with family at patient's bedside.     Family Received Grief Packet?  Yes     NAME AND PHONE NUMBER OF DOCTOR SIGNING DEATH CERTIFICATE: Dr. Judith Mann [phone]       are now contacting the  home after a patient death.  spoke to [facility]  home indicating family's choice for their services.  called  home on 2024 at 4:10 PM.    Copy of COMPLETED Release of Body Form Received?  Yes    Patient's belongings: This  did not handle patient's belongings.      HOME:  Name: [facility]  City: [place]  Phone Number: [phone]    NEXT OF KIN:  Name: Anupama Eaton  Relationship: Mother  Street Address: [address] [address]  City: [place]  State: [place]  Zip code: [address]   Phone Number: [phone]    ANY FOLLOW-UP NEEDED?  No    Electronically signed by Chaplain Kitty, on 2024 at 2:28 PM.  [facility]  [phone]

## 2024-05-19 NOTE — ED NOTES
Pt removed from NRB and nasal cannula. Ceribell removed. Infusions stopped. Monitor turned off. Family at bedside along with writer and Dr. Murray.

## 2024-05-19 NOTE — ED NOTES
HIPAA disclosure forms completed and submitted to [facility], [employer] [facility], and [facility].

## 2024-05-20 ENCOUNTER — TELEPHONE (OUTPATIENT)
Dept: FAMILY MEDICINE CLINIC | Age: 39
End: 2024-05-20

## 2024-05-20 NOTE — PROCEDURES
PROCEDURE NOTE  Date: 5/19/2024   Name: Navin Suarez III  YOB: 1985    Procedures    Date: 5/19/24    Referring physician: Dr. Kenneth Michel  Patient aged 38 y with question of seizures. EEG done to assess for epileptiform activity.    Introduction  This routine  1 hour 34 minutes EEG was recorded using the Post-i one band system. Automated seizure detection algorithms were applied.    Description  During the maximal alert state, a well-regulated, symmetric, 3 to 12 Hz frequencies were seen bilaterally.  There was intermittent focal slowing. Stage I and stage II sleep were not clearly observed. There were no interictal epileptiform discharges or electrographic seizures.  High impedence was seen as the study progressed.    Activations  Hyperventilation was not performed. Intermittent photic stimulation was not performed     Impression  There is abnormal EEG with delta slowing suggestive of moderate to severe encephalopathy.  The faster beta frequencies are suggestive of medication use.    No epileptiform discharges or seizures were identified. Please note the absence of such activity on this record cannot conclusively rule out an epileptic disorder. If such is still clinically suspected, a repeat study with sleep deprivation and/or prolonged sampling may be helpful.    This was a limited 8 channel EEG, which is prone to artifact and a conventional 10-20 lead EEG may be considered if clinically indicated.     Please note this EEG was meant to screen for emergent condition and is prone to artifact and with some limitations. The interpretation of this EEG result should be taken only with clinical correlation. Ideally regular EEG with full leads should be considered when possible.     Electronically signed by Jonathan Mariee MD on 5/19/2024

## 2024-05-20 NOTE — TELEPHONE ENCOUNTER
Called received from DEZ ALLEN  stating patient recently passed away and is requesting PCP to sign off on the death certificate.    Date: 05/19/2024  Time: WAS IN ED  Cause of Death: SHE DID NOT GIVE REASONING AS PT PASSED AWAY IN ED.

## 2024-05-22 LAB — SURGICAL PATHOLOGY REPORT: NORMAL

## 2024-05-29 ENCOUNTER — TELEPHONE (OUTPATIENT)
Age: 39
End: 2024-05-29

## (undated) DEVICE — SWABSTICK MEDICATED 10% POVIDONE IOD PVP SGL ANTISEP SAT

## (undated) DEVICE — GLOVE ORANGE PI 7 1/2   MSG9075

## (undated) DEVICE — DEFENDO AIR WATER SUCTION AND BIOPSY VALVE KIT FOR  OLYMPUS: Brand: DEFENDO AIR/WATER/SUCTION AND BIOPSY VALVE

## (undated) DEVICE — FORCEPS BX L240CM WRK CHN 2.8MM STD CAP W/ NDL MIC MESH

## (undated) DEVICE — OINTMENT ANTIBIOTIC TRIPLE 0.9G PK

## (undated) DEVICE — BITEBLOCK 54FR W/ DENT RIM BLOX

## (undated) DEVICE — ENDO KIT W/SYRINGE: Brand: MEDLINE INDUSTRIES, INC.

## (undated) DEVICE — MIC* SAFETY PERCUTANEOUS ENDOSCOPIC GASTROSTOMY PEG KIT - 20 FR - PUSH OTW: Brand: MIC PEG TUBE